# Patient Record
Sex: FEMALE | Race: WHITE | NOT HISPANIC OR LATINO | Employment: UNEMPLOYED | URBAN - METROPOLITAN AREA
[De-identification: names, ages, dates, MRNs, and addresses within clinical notes are randomized per-mention and may not be internally consistent; named-entity substitution may affect disease eponyms.]

---

## 2018-03-26 ENCOUNTER — HOSPITAL ENCOUNTER (EMERGENCY)
Facility: HOSPITAL | Age: 28
Discharge: HOME | End: 2018-03-26
Attending: EMERGENCY MEDICINE

## 2018-03-26 VITALS
SYSTOLIC BLOOD PRESSURE: 97 MMHG | HEART RATE: 75 BPM | BODY MASS INDEX: 20.37 KG/M2 | DIASTOLIC BLOOD PRESSURE: 72 MMHG | OXYGEN SATURATION: 99 % | RESPIRATION RATE: 16 BRPM | WEIGHT: 115 LBS

## 2018-03-26 DIAGNOSIS — T40.1X1A ACCIDENTAL OVERDOSE OF HEROIN, INITIAL ENCOUNTER: Primary | ICD-10-CM

## 2018-03-26 DIAGNOSIS — R41.82 ALTERED MENTAL STATUS, UNSPECIFIED ALTERED MENTAL STATUS TYPE: ICD-10-CM

## 2018-03-26 DIAGNOSIS — J96.00 ACUTE RESPIRATORY FAILURE, UNSPECIFIED WHETHER WITH HYPOXIA OR HYPERCAPNIA: ICD-10-CM

## 2018-03-26 LAB
ALBUMIN SERPL BCP-MCNC: 3.4 G/DL
ALP SERPL-CCNC: 57 U/L
ALT SERPL W/O P-5'-P-CCNC: 30 U/L
ANION GAP SERPL CALC-SCNC: 16 MMOL/L
AST SERPL-CCNC: 35 U/L
BASOPHILS # BLD AUTO: 0.02 K/UL
BASOPHILS NFR BLD: 0.3 %
BILIRUB SERPL-MCNC: 0.3 MG/DL
BUN SERPL-MCNC: 7 MG/DL
CALCIUM SERPL-MCNC: 8.4 MG/DL
CHLORIDE SERPL-SCNC: 106 MMOL/L
CO2 SERPL-SCNC: 16 MMOL/L
CREAT SERPL-MCNC: 0.9 MG/DL
DIFFERENTIAL METHOD: ABNORMAL
EOSINOPHIL # BLD AUTO: 0 K/UL
EOSINOPHIL NFR BLD: 0.4 %
ERYTHROCYTE [DISTWIDTH] IN BLOOD BY AUTOMATED COUNT: 13 %
EST. GFR  (AFRICAN AMERICAN): >60 ML/MIN/1.73 M^2
EST. GFR  (NON AFRICAN AMERICAN): >60 ML/MIN/1.73 M^2
GLUCOSE SERPL-MCNC: 290 MG/DL
HCT VFR BLD AUTO: 36.2 %
HGB BLD-MCNC: 11.7 G/DL
IMM GRANULOCYTES # BLD AUTO: 0.05 K/UL
IMM GRANULOCYTES NFR BLD AUTO: 0.7 %
INR PPP: 1
LYMPHOCYTES # BLD AUTO: 3.3 K/UL
LYMPHOCYTES NFR BLD: 48.9 %
MCH RBC QN AUTO: 31.6 PG
MCHC RBC AUTO-ENTMCNC: 32.3 G/DL
MCV RBC AUTO: 98 FL
MONOCYTES # BLD AUTO: 0.5 K/UL
MONOCYTES NFR BLD: 7.1 %
NEUTROPHILS # BLD AUTO: 2.9 K/UL
NEUTROPHILS NFR BLD: 42.6 %
NRBC BLD-RTO: 0 /100 WBC
PLATELET # BLD AUTO: 271 K/UL
PMV BLD AUTO: 9.5 FL
POTASSIUM SERPL-SCNC: 3.5 MMOL/L
PROT SERPL-MCNC: 7.2 G/DL
PROTHROMBIN TIME: 10.4 SEC
RBC # BLD AUTO: 3.7 M/UL
SODIUM SERPL-SCNC: 138 MMOL/L
WBC # BLD AUTO: 6.73 K/UL

## 2018-03-26 PROCEDURE — 80053 COMPREHEN METABOLIC PANEL: CPT

## 2018-03-26 PROCEDURE — 25000003 PHARM REV CODE 250: Performed by: EMERGENCY MEDICINE

## 2018-03-26 PROCEDURE — 85025 COMPLETE CBC W/AUTO DIFF WBC: CPT

## 2018-03-26 PROCEDURE — 96361 HYDRATE IV INFUSION ADD-ON: CPT

## 2018-03-26 PROCEDURE — 96360 HYDRATION IV INFUSION INIT: CPT

## 2018-03-26 PROCEDURE — 99284 EMERGENCY DEPT VISIT MOD MDM: CPT | Mod: 25

## 2018-03-26 PROCEDURE — 85610 PROTHROMBIN TIME: CPT

## 2018-03-26 PROCEDURE — 99291 CRITICAL CARE FIRST HOUR: CPT | Mod: ,,, | Performed by: EMERGENCY MEDICINE

## 2018-03-26 RX ADMIN — SODIUM CHLORIDE, POTASSIUM CHLORIDE, SODIUM LACTATE AND CALCIUM CHLORIDE 1000 ML: 600; 310; 30; 20 INJECTION, SOLUTION INTRAVENOUS at 03:03

## 2018-03-26 NOTE — ED PROVIDER NOTES
Encounter Date: 3/26/2018       History     Chief Complaint   Patient presents with    Drug Overdose     HPI   29 yo female who presented to the ED unresponsive in friend's car with minimal respiratory drive. Pt was unresponsive and provided no history. Clinical picture was suspicious for opiate overdose. Nurses helped to pull patient out of care and bring patient into resuscitation bay.  After IV access and narcan, pt became more responsive. States she did heroin a few hours earlier and then took 3 xanax bars around 2 am and doesn't remember anything else. Denies SI or intentions to harm self.     Review of patient's allergies indicates:  No Known Allergies  Past Medical History:   Diagnosis Date    Gall stones      No past surgical history on file.  Family History   Problem Relation Age of Onset    No Known Problems Mother     No Known Problems Father     Emphysema Maternal Grandmother     Cancer Maternal Grandfather     Diabetes Paternal Grandmother     Heart disease Paternal Grandmother      Social History   Substance Use Topics    Smoking status: Current Every Day Smoker     Packs/day: 0.50     Years: 8.00     Types: Cigarettes    Smokeless tobacco: Never Used    Alcohol use Yes      Comment: occasionally     Review of Systems   Unable to perform ROS: Patient unresponsive       Physical Exam     Initial Vitals [03/26/18 0344]   BP Pulse Resp Temp SpO2   -- (!) 120 (!) 8 -- --      MAP       --         Physical Exam    Nursing note and vitals reviewed.  Constitutional: She is diaphoretic.   Eyes:   Pinpoint   Cardiovascular: Regular rhythm, normal heart sounds and intact distal pulses. Exam reveals no gallop and no friction rub.    No murmur heard.  Tachycardia   Pulmonary/Chest:   Bradypnea   Minimal respiratory effort  Coarse BS b/l   Neurological: She is unresponsive.         ED Course   Critical Care  Date/Time: 3/26/2018 3:40 AM  Performed by: NEIL VEGA  Authorized by: SILVIA  NEIL DILLARD   Direct patient critical care time: 15 minutes  Additional history critical care time: 5 minutes  Ordering / reviewing critical care time: 10 minutes  Documentation critical care time: 5 minutes  Total critical care time (exclusive of procedural time) : 35 minutes  Critical care time was exclusive of teaching time.  Critical care was necessary to treat or prevent imminent or life-threatening deterioration of the following conditions: CNS failure or compromise, shock and toxidrome.  Critical care was time spent personally by me on the following activities: development of treatment plan with patient or surrogate, interpretation of cardiac output measurements, evaluation of patient's response to treatment, examination of patient, obtaining history from patient or surrogate, ordering and performing treatments and interventions, ordering and review of laboratory studies, pulse oximetry, re-evaluation of patient's condition and review of old charts.        Labs Reviewed   CBC W/ AUTO DIFFERENTIAL - Abnormal; Notable for the following:        Result Value    RBC 3.70 (*)     Hemoglobin 11.7 (*)     Hematocrit 36.2 (*)     MCH 31.6 (*)     Immature Granulocytes 0.7 (*)     Immature Grans (Abs) 0.05 (*)     Lymph% 48.9 (*)     All other components within normal limits   COMPREHENSIVE METABOLIC PANEL - Abnormal; Notable for the following:     CO2 16 (*)     Glucose 290 (*)     Calcium 8.4 (*)     Albumin 3.4 (*)     All other components within normal limits   PROTIME-INR                   APC / Resident Notes:   27 yo female who presented to the ED unresponsive in friend's car with minimal respiratory drive suspicious for opiate overdose. Initially unresponsive but responded to intranasal narcan and is now coherent and talking. Will obtain basic labs and order IVF. Will continue to reassess mental status.     Jose Umana, PGY-2  4:21 AM           Attending Attestation:   Physician Attestation Statement for  Resident:  As the supervising MD   Physician Attestation Statement: I have personally seen and examined this patient.   I agree with the above history. -: 28-year-old woman with history of opiate dependency presents for evaluation of altered mental status/apnea after suspected IV opiate use.   As the supervising MD I agree with the above PE.    As the supervising MD I agree with the above treatment, course, plan, and disposition.  I have reviewed the following: old records at this facility.                       Clinical Impression:   The primary encounter diagnosis was Accidental overdose of heroin, initial encounter. Diagnoses of Altered mental status, unspecified altered mental status type and Acute respiratory failure, unspecified whether with hypoxia or hypercapnia were also pertinent to this visit.    Disposition:   Disposition: Discharged  Condition: Stable                        Sedrick Tello MD  03/27/18 8093

## 2018-03-26 NOTE — ED NOTES
I acknowledge care of this pt. Pt AAO x 4 lying in stretcher with friends at bedside. Pt on continuous cardiac monitor and pulse ox with blood pressure cycling every thirty minutes. Respirations are unlabored and equal. Side rails up x 2 bed locked and in low position with call light in reach.Pt aware of plan of care. VSS. NAD noted. Will continue to monitor

## 2018-10-22 ENCOUNTER — TELEPHONE (OUTPATIENT)
Dept: OBSTETRICS AND GYNECOLOGY | Facility: CLINIC | Age: 28
End: 2018-10-22

## 2018-10-22 NOTE — TELEPHONE ENCOUNTER
----- Message from Zehra Hurtado sent at 10/22/2018  4:31 PM CDT -----  Contact: pt      Can the clinic reply in [facility]: no    Who Called: pt    Date of Positive Preg Test: 2 weeks ago    1st day of Last Menstrual Cycle: august 9th    List Any Difficulties: no    What Number to Call Back: [phone]

## 2018-10-22 NOTE — TELEPHONE ENCOUNTER
conducted a pre-surgery visit with Bull Gonzalez, who is a 72 y.o.,female. The  provided the following Interventions:  Initiated a relationship of care and support. Offered prayer and assurance of continued prayers on patient's behalf. Plan:  Chaplains will continue to follow and will provide pastoral care on an as needed/requested basis.  recommends bedside caregivers page  on duty if patient shows signs of acute spiritual or emotional distress.     [address] [facility]   Board Certified [address] [place] [address]   [phone] Call returned appt scheduled with Dr. Bradley on Friday

## 2018-11-02 ENCOUNTER — APPOINTMENT (OUTPATIENT)
Dept: LAB | Facility: HOSPITAL | Age: 28
End: 2018-11-02
Attending: OBSTETRICS & GYNECOLOGY
Payer: MEDICAID

## 2018-11-02 ENCOUNTER — OFFICE VISIT (OUTPATIENT)
Dept: OBSTETRICS AND GYNECOLOGY | Facility: CLINIC | Age: 28
End: 2018-11-02
Payer: MEDICAID

## 2018-11-02 VITALS
DIASTOLIC BLOOD PRESSURE: 70 MMHG | BODY MASS INDEX: 21.87 KG/M2 | SYSTOLIC BLOOD PRESSURE: 110 MMHG | HEIGHT: 63 IN | WEIGHT: 123.44 LBS

## 2018-11-02 DIAGNOSIS — F11.20 SUBOXONE MAINTENANCE TREATMENT COMPLICATING PREGNANCY, ANTEPARTUM: ICD-10-CM

## 2018-11-02 DIAGNOSIS — Z34.90 PREGNANCY WITH ADOPTION PLANNED, ANTEPARTUM: ICD-10-CM

## 2018-11-02 DIAGNOSIS — O99.320 SUBOXONE MAINTENANCE TREATMENT COMPLICATING PREGNANCY, ANTEPARTUM: ICD-10-CM

## 2018-11-02 DIAGNOSIS — O21.9 NAUSEA/VOMITING IN PREGNANCY: ICD-10-CM

## 2018-11-02 DIAGNOSIS — Z32.01 POSITIVE PREGNANCY TEST: Primary | ICD-10-CM

## 2018-11-02 DIAGNOSIS — F19.21 HISTORY OF DRUG DEPENDENCE/ABUSE: ICD-10-CM

## 2018-11-02 LAB
BASOPHILS # BLD AUTO: 0.01 K/UL
BASOPHILS NFR BLD: 0.2 %
DIFFERENTIAL METHOD: ABNORMAL
EOSINOPHIL # BLD AUTO: 0 K/UL
EOSINOPHIL NFR BLD: 0 %
ERYTHROCYTE [DISTWIDTH] IN BLOOD BY AUTOMATED COUNT: 14.3 %
HCT VFR BLD AUTO: 34.6 %
HGB BLD-MCNC: 11.3 G/DL
IMM GRANULOCYTES # BLD AUTO: 0.06 K/UL
IMM GRANULOCYTES NFR BLD AUTO: 1.2 %
LYMPHOCYTES # BLD AUTO: 0.4 K/UL
LYMPHOCYTES NFR BLD: 8 %
MCH RBC QN AUTO: 29.4 PG
MCHC RBC AUTO-ENTMCNC: 32.7 G/DL
MCV RBC AUTO: 90 FL
MONOCYTES # BLD AUTO: 0.1 K/UL
MONOCYTES NFR BLD: 1 %
NEUTROPHILS # BLD AUTO: 4.4 K/UL
NEUTROPHILS NFR BLD: 89.6 %
NRBC BLD-RTO: 0 /100 WBC
PLATELET # BLD AUTO: 255 K/UL
PMV BLD AUTO: 9.8 FL
RBC # BLD AUTO: 3.85 M/UL
WBC # BLD AUTO: 4.87 K/UL

## 2018-11-02 PROCEDURE — 87340 HEPATITIS B SURFACE AG IA: CPT

## 2018-11-02 PROCEDURE — 86762 RUBELLA ANTIBODY: CPT

## 2018-11-02 PROCEDURE — 86703 HIV-1/HIV-2 1 RESULT ANTBDY: CPT

## 2018-11-02 PROCEDURE — 86901 BLOOD TYPING SEROLOGIC RH(D): CPT

## 2018-11-02 PROCEDURE — 99203 OFFICE O/P NEW LOW 30 MIN: CPT | Mod: S$PBB,TH,, | Performed by: OBSTETRICS & GYNECOLOGY

## 2018-11-02 PROCEDURE — 86592 SYPHILIS TEST NON-TREP QUAL: CPT

## 2018-11-02 PROCEDURE — 88175 CYTOPATH C/V AUTO FLUID REDO: CPT

## 2018-11-02 PROCEDURE — 99999 PR PBB SHADOW E&M-EST. PATIENT-LVL III: CPT | Mod: PBBFAC,,, | Performed by: OBSTETRICS & GYNECOLOGY

## 2018-11-02 PROCEDURE — 86787 VARICELLA-ZOSTER ANTIBODY: CPT

## 2018-11-02 PROCEDURE — 85025 COMPLETE CBC W/AUTO DIFF WBC: CPT

## 2018-11-02 PROCEDURE — 99213 OFFICE O/P EST LOW 20 MIN: CPT | Mod: PBBFAC,25,TH,PO | Performed by: OBSTETRICS & GYNECOLOGY

## 2018-11-02 PROCEDURE — 87086 URINE CULTURE/COLONY COUNT: CPT

## 2018-11-02 PROCEDURE — 87491 CHLMYD TRACH DNA AMP PROBE: CPT

## 2018-11-02 RX ORDER — PROMETHAZINE HYDROCHLORIDE 25 MG/1
25 TABLET ORAL EVERY 6 HOURS PRN
Qty: 30 TABLET | Refills: 1 | Status: ON HOLD | OUTPATIENT
Start: 2018-11-02 | End: 2019-05-24 | Stop reason: HOSPADM

## 2018-11-02 RX ORDER — PYRIDOXINE HCL (VITAMIN B6) 25 MG
25 TABLET ORAL 3 TIMES DAILY
Qty: 90 TABLET | Refills: 2 | Status: SHIPPED | OUTPATIENT
Start: 2018-11-02 | End: 2019-01-31

## 2018-11-02 RX ORDER — AMOXICILLIN 500 MG/1
CAPSULE ORAL
Refills: 0 | Status: ON HOLD | COMMUNITY
Start: 2018-10-31 | End: 2019-05-24 | Stop reason: HOSPADM

## 2018-11-02 RX ORDER — BUPRENORPHINE HYDROCHLORIDE AND NALOXONE HYDROCHLORIDE DIHYDRATE 2; .5 MG/1; MG/1
TABLET SUBLINGUAL EVERY 6 HOURS PRN
COMMUNITY

## 2018-11-02 NOTE — PROGRESS NOTES
Chief Complaint   Patient presents with    Amenorrhea     LMP 18       HPI:  28 y.o. female  presents as a new patient for confirmation of pregnancy.    Patient's last menstrual period was 2018 (exact date).    - Nausea:  Yes  - Vomiting: Yes  - Cramping: No  - Bleeding:  No    - Denies family history of bleeding disorders, birth defects, or mental disability    - U/S at [facility] on 10/9/2018 gave an EGA of 6 weeks    - History of drug abuse; currently on suboxone therapy  - No illicit drug use x3 months  - Does not recall who prescribes her suboxone; she has an appointment with them next month    - Plans to put the baby up for adoption; she has already contacted a     Contraception: None  Pap: 2018, No recent documented pap       Past Medical History:   Diagnosis Date    Gall stones      Past Surgical History:   Procedure Laterality Date    CHOLECYSTECTOMY      CHOLECYSTECTOMY-LAPAROSCOPIC N/A 2016    Performed by Jose Orantes MD at [facility] OR       Social History     Tobacco Use    Smoking status: Current Every Day Smoker     Packs/day: 0.50     Years: 8.00     Pack years: 4.00     Types: Cigarettes    Smokeless tobacco: Never Used   Substance Use Topics    Alcohol use: No     Frequency: Never     Comment: occasionally; pre pregnancy     Family History   Problem Relation Age of Onset    No Known Problems Mother     No Known Problems Father     Emphysema Maternal Grandmother     Cancer Maternal Grandfather     Diabetes Paternal Grandmother     Heart disease Paternal Grandmother     Breast cancer Neg Hx     Colon cancer Neg Hx     Ovarian cancer Neg Hx      OB History    Para Term  AB Living   3       2 0   SAB TAB Ectopic Multiple Live Births   2              # Outcome Date GA Lbr Louis/2nd Weight Sex Delivery Anes PTL Lv   3 Current            2 SAB            1 SAB                   MEDICATIONS: Reviewed with patient.  ALLERGIES: Patient  "has no known allergies.     ROS:  Review of Systems   Constitutional: Negative for fever.   Respiratory: Negative for shortness of breath.    Cardiovascular: Negative for chest pain.   Gastrointestinal: Negative for abdominal pain, nausea and vomiting.   Endocrine: Negative for hot flashes.   Genitourinary: Negative for pelvic pain and vaginal bleeding.   Neurological: Negative for headaches.   Hematological: Does not bruise/bleed easily.   Psychiatric/Behavioral: Negative for depression.   Breast: Negative for breast mass, breast pain, nipple discharge and skin changes      PHYSICAL EXAM:    /70   Ht 5' 3" (1.6 m)   Wt 56 kg (123 lb 7.3 oz)   LMP 08/08/2018 (Exact Date)   BMI 21.87 kg/m²     Physical Exam:   Constitutional: She is oriented to person, place, and time. She appears well-developed.   No fever    HENT:   Head: Normocephalic.     Neck: No thyromegaly present.    Cardiovascular: Normal rate.     Pulmonary/Chest: Effort normal. Right breast exhibits no mass, no nipple discharge, no skin change, no tenderness and no swelling. Left breast exhibits no mass, no nipple discharge, no skin change, no tenderness and no swelling. Breasts are symmetrical.        Abdominal: She exhibits no mass. There is no hepatosplenomegaly. There is no tenderness. No hernia.     Genitourinary: Vagina normal. Uterus is not enlarged and not tender. Cervix is normal. Right adnexum displays no tenderness and no fullness. Left adnexum displays no tenderness and no fullness. No vaginal discharge found. Additional cervical findings: pap smear done  Genitourinary Comments: External genitalia: Normal  Urethra: No tenderness; normal meatus  Bladder: No tenderness              Lymphadenopathy:     She has no cervical adenopathy.     She has no axillary adenopathy.    Neurological: She is alert and oriented to person, place, and time.     Psychiatric: She has a normal mood and affect.         ASSESSMENT & PLAN:   Positive pregnancy " test  -     CBC auto differential  -     US OB/GYN Procedure ([facility]); Future  -     C. trachomatis/N. gonorrhoeae by AMP DNA  -     Hepatitis B surface antigen  -     HIV-1 and HIV-2 antibodies  -     US MFM Procedure ([facility]); Future  -     Liquid-based pap smear, screening  -     RPR  -     Rubella antibody, IgG  -     Type & Screen  -     Urine culture  -     Varicella zoster antibody, IgG    Nausea/vomiting in pregnancy  -     doxylamine succinate 25 mg tablet; Take 1 tablet (25 mg total) by mouth every evening.  Dispense: 30 tablet; Refill: 2  -     pyridoxine, vitamin B6, (B-6) 25 MG Tab; Take 1 tablet (25 mg total) by mouth 3 (three) times daily.  Dispense: 90 tablet; Refill: 2  -     promethazine (PHENERGAN) 25 MG tablet; Take 1 tablet (25 mg total) by mouth every 6 (six) hours as needed for Nausea.  Dispense: 30 tablet; Refill: 1    Suboxone maintenance treatment complicating pregnancy, antepartum    History of drug dependence/abuse    Pregnancy with adoption planned, antepartum        - UPT positive  - LMP=8/8/2018 --> TALON=5/15/2019 --> EGA=12.2  - U/S at [facility] on 10/9/2018 at 6.6 --> TALON=5/29/2019 --> EGA=10.2  - PNV  - Dating u/s  - Initial prenatal labs  - Pain and bleeding precautions given  - Return to clinic in 4 weeks for initial prenatal visit    - Aneuploidy screening: NT and sequential screen ordered    - VitB6, unisom, and phenergan for N/V    - Counseled patient that withdrawal is not recommended in pregnancy  - Recommended continued suboxone and regular follow-up with her prescriber  - Will screen for HepC on return to clinic    NEW PREGNANCY COUNSELING  Patient was counseled today on:  - Routine prenatal blood tests including HIV and anticipated course of prenatal care  - Prenatal vitamins and folic acid  - Weight gain, nutrition, and exercise  - Seafood and mercury  - Properly heating deli and prepared meats and avoiding unrefrigerated deli  meats, cheeses, and milk  products,   - Avoiding cat litter and raw meats due to risk of Toxoplasmosis precautions   - Accuracy of the LMP-based TALON and the value of an early TV-u/s  - Aneuploidy and neural tube screening -- cffDNA, sequential screening, and AFP screen at 15 weeks  - OTC medication in the first trimester  - Harmful effects of smoking, etOH, and recreational drugs  - [facility] u/s  at 18-20 weeks.  - Common complaints of pregnancy  - Seat belt use  - Childbirth classes and hospital facilities  - All questions were answered    Total visit time was 30 minutes with greater than 50% of time dedicated to counseling.

## 2018-11-03 LAB
ABO + RH BLD: NORMAL
BACTERIA UR CULT: NO GROWTH
BLD GP AB SCN CELLS X3 SERPL QL: NORMAL
C TRACH DNA SPEC QL NAA+PROBE: NOT DETECTED
N GONORRHOEA DNA SPEC QL NAA+PROBE: NOT DETECTED
VARICELLA INTERPRETATION: POSITIVE
VARICELLA ZOSTER IGG: 3.93 ISR

## 2018-11-05 LAB
HBV SURFACE AG SERPL QL IA: NEGATIVE
HIV 1+2 AB+HIV1 P24 AG SERPL QL IA: NEGATIVE
RPR SER QL: NORMAL
RUBV IGG SER-ACNC: 12.3 IU/ML
RUBV IGG SER-IMP: REACTIVE

## 2018-11-06 ENCOUNTER — TELEPHONE (OUTPATIENT)
Dept: OBSTETRICS AND GYNECOLOGY | Facility: CLINIC | Age: 28
End: 2018-11-06

## 2018-11-06 NOTE — TELEPHONE ENCOUNTER
----- Message from Jade Short MA sent at 11/6/2018  1:25 PM CST -----  Contact: ROSS MCCAIN [[id number]]  Did you attempt to call her and see medication she was talking about? I was not working with  on Friday.  ----- Message -----  From: Anderson Palafox MA  Sent: 11/6/2018   1:18 PM  To: Jade Short MA    Hey, this message was sent to you because Dr. Bradley saw the patient.  ----- Message -----  From: Jade Short MA  Sent: 11/6/2018  11:52 AM  To: Anderson Palafox MA    Why is this forwarded to me?  ----- Message -----  From: Anderson Palafox MA  Sent: 11/6/2018  11:46 AM  To: Jade Short MA        ----- Message -----  From: Jeanne Churchill  Sent: 11/6/2018  11:35 AM  To: , #              Name of Who is Calling:ROSS MCCAIN [[id number]]    What is the request in detail: patient would like Prior authorization for medication prescribed Friday. Patient does not know the name of the medication. Please call      Can the clinic reply by [facility]: no    What Number to Call Back if not in [facility]:[phone]

## 2018-11-14 PROBLEM — Z34.90 PREGNANCY WITH ADOPTION PLANNED, ANTEPARTUM: Status: ACTIVE | Noted: 2018-11-14

## 2018-11-19 ENCOUNTER — TELEPHONE (OUTPATIENT)
Dept: OBSTETRICS AND GYNECOLOGY | Facility: CLINIC | Age: 28
End: 2018-11-19

## 2018-11-19 NOTE — TELEPHONE ENCOUNTER
----- Message from Khadijah Torres sent at 11/19/2018  1:07 PM CST -----  Contact: self  Pt had a appt on Friday for a dating ultrasound but missed it . She wants you to reschedule her one please. Call back number is [phone]

## 2018-11-26 ENCOUNTER — TELEPHONE (OUTPATIENT)
Dept: OBSTETRICS AND GYNECOLOGY | Facility: CLINIC | Age: 28
End: 2018-11-26

## 2018-11-26 NOTE — TELEPHONE ENCOUNTER
----- Message from Loida Singh sent at 11/26/2018  8:07 AM CST -----  Contact: SABAS  Pt was a no show for her MFM appt. sh

## 2018-12-19 ENCOUNTER — TELEPHONE (OUTPATIENT)
Dept: OBSTETRICS AND GYNECOLOGY | Facility: CLINIC | Age: 28
End: 2018-12-19

## 2018-12-19 NOTE — TELEPHONE ENCOUNTER
Called and spoke with pt. Informed her that she needs to have an US done, will place order and MFM will contact her to schedule US. Offered pt an appt before or after her US, pt chooses to come in after having US done. Will call office when she has an US scheduled to schedule a JUSTYNA. Pt has clinic number. Pt verbalized understanding and no further questions.

## 2018-12-27 DIAGNOSIS — Z36.89 ESTABLISH GESTATIONAL AGE, ULTRASOUND: Primary | ICD-10-CM

## 2018-12-27 DIAGNOSIS — Z36.89 ENCOUNTER FOR FETAL ANATOMIC SURVEY: ICD-10-CM

## 2019-01-08 ENCOUNTER — TELEPHONE (OUTPATIENT)
Dept: OBSTETRICS AND GYNECOLOGY | Facility: CLINIC | Age: 29
End: 2019-01-08

## 2019-01-08 NOTE — TELEPHONE ENCOUNTER
Spoke to mother patient's. Attempted to contact patient regarding missed MFM appointment. Mother states she does not know how to contact her and does not know her current location.

## 2019-01-08 NOTE — TELEPHONE ENCOUNTER
----- Message from Loida Singh sent at 1/8/2019  8:33 AM CST -----  Contact: SABAS  Pt was a no show for her MFM appt. sh

## 2019-02-13 ENCOUNTER — TELEPHONE (OUTPATIENT)
Dept: OBSTETRICS AND GYNECOLOGY | Facility: CLINIC | Age: 29
End: 2019-02-13

## 2019-02-13 NOTE — TELEPHONE ENCOUNTER
----- Message from Francis Gould sent at 2/13/2019  3:58 PM CST -----  Contact: ROSS MCCAIN [[id number]]  Name of Who is Calling: ROSS MCCAIN [[id number]]       What is the request in detail: Pt call in regards to rescheduling her Routine OB. Pt request call back from staff to schedule. Please advise      Can the clinic reply by [facility]: No       What Number to Call Back if not in [facility]: [phone]

## 2019-02-13 NOTE — TELEPHONE ENCOUNTER
Spoke to patient. Patient relocated to [place] for a month and has not had prenatal care since last visit in November due her medicaid not being accepted. Scheduled appointment to be seen.

## 2019-02-14 ENCOUNTER — ROUTINE PRENATAL (OUTPATIENT)
Dept: OBSTETRICS AND GYNECOLOGY | Facility: CLINIC | Age: 29
End: 2019-02-14
Payer: MEDICAID

## 2019-02-14 VITALS
WEIGHT: 133.38 LBS | SYSTOLIC BLOOD PRESSURE: 104 MMHG | DIASTOLIC BLOOD PRESSURE: 60 MMHG | BODY MASS INDEX: 22.89 KG/M2

## 2019-02-14 DIAGNOSIS — O09.92 HIGH-RISK PREGNANCY IN SECOND TRIMESTER: Primary | ICD-10-CM

## 2019-02-14 DIAGNOSIS — F19.21 HISTORY OF DRUG DEPENDENCE/ABUSE: ICD-10-CM

## 2019-02-14 DIAGNOSIS — O99.320 SUBOXONE MAINTENANCE TREATMENT COMPLICATING PREGNANCY, ANTEPARTUM: ICD-10-CM

## 2019-02-14 DIAGNOSIS — F11.20 SUBOXONE MAINTENANCE TREATMENT COMPLICATING PREGNANCY, ANTEPARTUM: ICD-10-CM

## 2019-02-14 PROBLEM — O09.90 HIGH RISK PREGNANCY, ANTEPARTUM: Status: ACTIVE | Noted: 2018-11-14

## 2019-02-14 PROCEDURE — 99212 OFFICE O/P EST SF 10 MIN: CPT | Mod: PBBFAC,TH,PO | Performed by: OBSTETRICS & GYNECOLOGY

## 2019-02-14 PROCEDURE — 99213 PR OFFICE/OUTPT VISIT, EST, LEVL III, 20-29 MIN: ICD-10-PCS | Mod: TH,S$PBB,, | Performed by: OBSTETRICS & GYNECOLOGY

## 2019-02-14 PROCEDURE — 99999 PR PBB SHADOW E&M-EST. PATIENT-LVL II: CPT | Mod: PBBFAC,,, | Performed by: OBSTETRICS & GYNECOLOGY

## 2019-02-14 PROCEDURE — 99999 PR PBB SHADOW E&M-EST. PATIENT-LVL II: ICD-10-PCS | Mod: PBBFAC,,, | Performed by: OBSTETRICS & GYNECOLOGY

## 2019-02-14 PROCEDURE — 99213 OFFICE O/P EST LOW 20 MIN: CPT | Mod: TH,S$PBB,, | Performed by: OBSTETRICS & GYNECOLOGY

## 2019-02-14 NOTE — PROGRESS NOTES
Chief Complaint   Patient presents with    Routine Prenatal Visit       28 y.o., at 25w1d by Estimated Date of Delivery: 19    Complaints today: Back pain    ROS  OBSTETRICS:   Contractions N   Bleeding N   Loss of fluid N   Fetal mvmnt Y  GASTRO:   Nausea N   Vomiting N      OB History    Para Term  AB Living   3       2 0   SAB TAB Ectopic Multiple Live Births   2              # Outcome Date GA Lbr Louis/2nd Weight Sex Delivery Anes PTL Lv   3 Current            2 SAB            1 SAB                   Dating reviewed  Allergies and problem list reviewed and updated  Medical and surgical history reviewed  Prenatal labs reviewed and updated    PHYSICAL EXAM  /60   Wt 60.5 kg (133 lb 6.1 oz)   LMP 2018 (Exact Date)   BMI 22.89 kg/m²     GENERAL: No acute distress  NEURO: Alert and oriented x3  PSYCH: Normal mood and affect  PULMONARY: Non-labored respiration  ABDomen: Soft, gravid, nontender    ASSESSMENT AND PLAN     Problems (from 19 to present)     Problem Noted Resolved    High risk pregnancy, antepartum 2018 by DEMOND Bradley MD No    Overview Addendum 2019  4:45 PM by DEMOND Bradley MD     Dating - By 6 week u/s at [facility]; no records available.  U/S - Anatomy u/s ordered.  Aneuploidy screening - Missed window.  Vaccines -  Contraception -  Pap - 2018: NILM.  Needs assessment -           Suboxone maintenance treatment complicating pregnancy, antepartum 2018 by DEMOND Bradley MD No          Dating - Patient with early u/s in ED or [facility] (pt is poor historian).  At her last appt, she gave an TALON from u/s of 2019, but no records or u/s report is available.  TALON from LMP is 5/15/2019.  Will reconsider TALON based on anatomy u/s.  PNL - Schedule glucose screen and repeat CBC.  U/S - Anatomy u/s ordered.  Aneuploidy screening - Missed window.  Hx/o DA / Suboxone maintenance    - Patient endorses compliance with  suboxone and follow-up with her provider    - Encouraged continued suboxone use; counseled pt on risks of detox and relapse in pregnancy    - Check HepC AB with 2nd trimester labs.  Vaccines - Flu vaccine and Tdap on return to clinic.     labor precautions given  Follow-up: 4 weeks

## 2019-02-27 ENCOUNTER — PROCEDURE VISIT (OUTPATIENT)
Dept: MATERNAL FETAL MEDICINE | Facility: CLINIC | Age: 29
End: 2019-02-27
Payer: MEDICAID

## 2019-02-27 DIAGNOSIS — O09.92 HIGH-RISK PREGNANCY IN SECOND TRIMESTER: ICD-10-CM

## 2019-02-27 DIAGNOSIS — F11.20 SUBOXONE MAINTENANCE TREATMENT COMPLICATING PREGNANCY, ANTEPARTUM: ICD-10-CM

## 2019-02-27 DIAGNOSIS — Z36.4 ANTENATAL SCREENING FOR FETAL GROWTH RETARDATION USING ULTRASONICS: ICD-10-CM

## 2019-02-27 DIAGNOSIS — Z36.3 ANTENATAL SCREENING FOR MALFORMATION USING ULTRASONICS: ICD-10-CM

## 2019-02-27 DIAGNOSIS — O99.320 SUBOXONE MAINTENANCE TREATMENT COMPLICATING PREGNANCY, ANTEPARTUM: ICD-10-CM

## 2019-02-27 DIAGNOSIS — F19.21 HISTORY OF DRUG DEPENDENCE/ABUSE: ICD-10-CM

## 2019-02-27 PROCEDURE — 76811 OB US DETAILED SNGL FETUS: CPT | Mod: 26,S$PBB,, | Performed by: OBSTETRICS & GYNECOLOGY

## 2019-02-27 PROCEDURE — 99499 NO LOS: ICD-10-PCS | Mod: S$PBB,,, | Performed by: OBSTETRICS & GYNECOLOGY

## 2019-02-27 PROCEDURE — 76811 PR US, OB FETAL EVAL & EXAM, TRANSABDOM,FIRST GESTATION: ICD-10-PCS | Mod: 26,S$PBB,, | Performed by: OBSTETRICS & GYNECOLOGY

## 2019-02-27 PROCEDURE — 99499 UNLISTED E&M SERVICE: CPT | Mod: S$PBB,,, | Performed by: OBSTETRICS & GYNECOLOGY

## 2019-02-27 PROCEDURE — 76811 OB US DETAILED SNGL FETUS: CPT | Mod: PBBFAC | Performed by: OBSTETRICS & GYNECOLOGY

## 2019-03-04 ENCOUNTER — TELEPHONE (OUTPATIENT)
Dept: MATERNAL FETAL MEDICINE | Facility: CLINIC | Age: 29
End: 2019-03-04

## 2019-03-04 DIAGNOSIS — Z36.89 ENCOUNTER FOR ULTRASOUND TO CHECK FETAL GROWTH: Primary | ICD-10-CM

## 2019-03-04 DIAGNOSIS — O99.323 SUBOXONE MAINTENANCE TREATMENT COMPLICATING PREGNANCY, ANTEPARTUM, THIRD TRIMESTER: ICD-10-CM

## 2019-03-04 DIAGNOSIS — F11.20 SUBOXONE MAINTENANCE TREATMENT COMPLICATING PREGNANCY, ANTEPARTUM, THIRD TRIMESTER: ICD-10-CM

## 2019-03-04 NOTE — TELEPHONE ENCOUNTER
Message left to call [facility] back at her earliest convenience at [phone].    ----- Message from Mitzi Palacios MD sent at 3/2/2019  3:46 PM CST -----    Y'all:    Patient needs a follow up ultrasound with MFM in 4-4 wks for fetal growth.  Patient on suboxone maintenance.     Thank you  Philip

## 2019-03-07 ENCOUNTER — TELEPHONE (OUTPATIENT)
Dept: OBSTETRICS AND GYNECOLOGY | Facility: CLINIC | Age: 29
End: 2019-03-07

## 2019-03-07 NOTE — TELEPHONE ENCOUNTER
Called to notify pt that her US results were norrmal. No answer. Unable to leave message because VM box was full.

## 2019-03-07 NOTE — TELEPHONE ENCOUNTER
----- Message from Akilah Prado LPN sent at 3/4/2019  3:37 PM CST -----  DEMOND Bradley MD  [facility] Clinical Support Staff     03/04/2019    Please notify patient that her ultrasound was normal.  Thanks.

## 2019-04-09 ENCOUNTER — TELEPHONE (OUTPATIENT)
Dept: OBSTETRICS AND GYNECOLOGY | Facility: CLINIC | Age: 29
End: 2019-04-09

## 2019-04-10 ENCOUNTER — TELEPHONE (OUTPATIENT)
Dept: OBSTETRICS AND GYNECOLOGY | Facility: CLINIC | Age: 29
End: 2019-04-10

## 2019-04-10 NOTE — TELEPHONE ENCOUNTER
Patient called regarding a letter stating she can attend a opiate detox program at [place]. Advised patient per Dr. Bradley that detoxing is not recommend in pregnancy. Patient is schedule for ob follow up this week.

## 2019-04-10 NOTE — TELEPHONE ENCOUNTER
Spoke to patient. Advised patient per Dr. Bradley that she will need to come in the office to discuss letter for detox program.

## 2019-04-10 NOTE — TELEPHONE ENCOUNTER
----- Message from Rocky Moe sent at 4/10/2019  2:01 PM CDT -----  33wks ob pt needs to talk to Dr enciso about going to detox. Pt can be reached at [phone].

## 2019-04-22 ENCOUNTER — ROUTINE PRENATAL (OUTPATIENT)
Dept: OBSTETRICS AND GYNECOLOGY | Facility: CLINIC | Age: 29
End: 2019-04-22
Payer: MEDICAID

## 2019-04-22 ENCOUNTER — APPOINTMENT (OUTPATIENT)
Dept: LAB | Facility: HOSPITAL | Age: 29
End: 2019-04-22
Attending: OBSTETRICS & GYNECOLOGY
Payer: MEDICAID

## 2019-04-22 VITALS
SYSTOLIC BLOOD PRESSURE: 118 MMHG | WEIGHT: 134.94 LBS | DIASTOLIC BLOOD PRESSURE: 70 MMHG | BODY MASS INDEX: 23.16 KG/M2

## 2019-04-22 DIAGNOSIS — Z30.014 ENCOUNTER FOR INITIAL PRESCRIPTION OF INTRAUTERINE CONTRACEPTIVE DEVICE (IUD): ICD-10-CM

## 2019-04-22 DIAGNOSIS — F11.20 SUBOXONE MAINTENANCE TREATMENT COMPLICATING PREGNANCY, ANTEPARTUM: ICD-10-CM

## 2019-04-22 DIAGNOSIS — O99.323 HIGH RISK PREGNANCY DUE TO MATERNAL DRUG ABUSE IN THIRD TRIMESTER: Primary | ICD-10-CM

## 2019-04-22 DIAGNOSIS — F19.10 HIGH RISK PREGNANCY DUE TO MATERNAL DRUG ABUSE IN THIRD TRIMESTER: Primary | ICD-10-CM

## 2019-04-22 DIAGNOSIS — O99.320 SUBOXONE MAINTENANCE TREATMENT COMPLICATING PREGNANCY, ANTEPARTUM: ICD-10-CM

## 2019-04-22 LAB
ERYTHROCYTE [DISTWIDTH] IN BLOOD BY AUTOMATED COUNT: 15.6 % (ref 11.5–14.5)
HCT VFR BLD AUTO: 36.5 % (ref 37–48.5)
HGB BLD-MCNC: 11.4 G/DL (ref 12–16)
MCH RBC QN AUTO: 28.9 PG (ref 27–31)
MCHC RBC AUTO-ENTMCNC: 31.2 G/DL (ref 32–36)
MCV RBC AUTO: 93 FL (ref 82–98)
PLATELET # BLD AUTO: 367 K/UL (ref 150–350)
PMV BLD AUTO: 9.5 FL (ref 9.2–12.9)
RBC # BLD AUTO: 3.94 M/UL (ref 4–5.4)
WBC # BLD AUTO: 6.32 K/UL (ref 3.9–12.7)

## 2019-04-22 PROCEDURE — 86703 HIV-1/HIV-2 1 RESULT ANTBDY: CPT

## 2019-04-22 PROCEDURE — 86803 HEPATITIS C AB TEST: CPT

## 2019-04-22 PROCEDURE — 85027 COMPLETE CBC AUTOMATED: CPT

## 2019-04-22 PROCEDURE — 99212 OFFICE O/P EST SF 10 MIN: CPT | Mod: PBBFAC,TH,PO | Performed by: STUDENT IN AN ORGANIZED HEALTH CARE EDUCATION/TRAINING PROGRAM

## 2019-04-22 PROCEDURE — 99999 PR PBB SHADOW E&M-EST. PATIENT-LVL II: ICD-10-PCS | Mod: PBBFAC,,, | Performed by: STUDENT IN AN ORGANIZED HEALTH CARE EDUCATION/TRAINING PROGRAM

## 2019-04-22 PROCEDURE — 99213 OFFICE O/P EST LOW 20 MIN: CPT | Mod: TH,S$PBB,, | Performed by: STUDENT IN AN ORGANIZED HEALTH CARE EDUCATION/TRAINING PROGRAM

## 2019-04-22 PROCEDURE — 99213 PR OFFICE/OUTPT VISIT, EST, LEVL III, 20-29 MIN: ICD-10-PCS | Mod: TH,S$PBB,, | Performed by: STUDENT IN AN ORGANIZED HEALTH CARE EDUCATION/TRAINING PROGRAM

## 2019-04-22 PROCEDURE — 86592 SYPHILIS TEST NON-TREP QUAL: CPT

## 2019-04-22 PROCEDURE — 99999 PR PBB SHADOW E&M-EST. PATIENT-LVL II: CPT | Mod: PBBFAC,,, | Performed by: STUDENT IN AN ORGANIZED HEALTH CARE EDUCATION/TRAINING PROGRAM

## 2019-04-22 NOTE — PROGRESS NOTES
Complaints today: none. Would like to discuss referral/letter for suboxone program and has found physician at outside facility to help with process.   No contractions. Denies vaginal bleeding or LOF. +FM.     /70   Wt 61.2 kg (134 lb 14.7 oz)   LMP 2018 (Exact Date)   BMI 23.16 kg/m²     29 y.o., at 34w4d by Estimated Date of Delivery: 19  Patient Active Problem List   Diagnosis    Suboxone maintenance treatment complicating pregnancy, antepartum    History of drug dependence/abuse    High risk pregnancy, antepartum     OB History    Para Term  AB Living   3       2 0   SAB TAB Ectopic Multiple Live Births   2              # Outcome Date GA Lbr Louis/2nd Weight Sex Delivery Anes PTL Lv   3 Current            2 SAB            1 SAB                Dating reviewed    Allergies and problem list reviewed and updated    Medical and surgical history reviewed    Prenatal labs reviewed and updated    Physical Exam:  ABD: soft, gravid, nontender, S = D    Assessment:  Ashley was seen today for routine prenatal visit.    Diagnoses and all orders for this visit:    Suboxone maintenance treatment complicating pregnancy, antepartum  -     US MFM Procedure ([facility])-Future; Future    High risk pregnancy, antepartum  -     CBC Without Differential  -     HIV 1/2 Ag/Ab (4th Gen)  -     RPR  -     HEPATITIS C ANTIBODY  -     US MFM Procedure ([facility])-Future; Future      Plan:   1. Routine prenatal care  - Doing well, no complaints  - Would like letter for enrollment into detox program which will be provided for patient by Dr. Bradley  - Considering bottle feeding post partum  - Would like depo provera for contraception  - Tdap: desires, ordered today  - T3 labs, to be completed today  - Will re order hepatitis C antibody, to be collected today  - Follow up MFM US given suboxone use, ordered     follow up in 2 Weeks, kick counts, labor precautions

## 2019-04-22 NOTE — PROGRESS NOTES
Seen and examined.  Agree with note.  All questions answered.    Letter given to start suboxone therapy program.    Patient desires immediate post-placental Mirena.    Patient endorses heroin use through most of this pregnancy.  UDS on L&D.  Notify Peds and SW on L&D.

## 2019-04-22 NOTE — LETTER
April 22, 2019    Ashley Lopez  [address] [place] [address]              [facility]  [address] [facility] [place] [address]  Phone: [phone]    To Whom It May Concern:    Ms. Lopez was at [facility] on 4/22/2019 and is currently under our care for pregnancy.  Estimated Date of Delivery: 5/30/2019.    She may proceed with opioid agonist therapy.  Opioid agonist therapy, particularly buprenorphine, is the preferred method for management of opioid dependence in pregnancy.      Sincerely,          GARRICK Bradley MD

## 2019-04-23 ENCOUNTER — TELEPHONE (OUTPATIENT)
Dept: MATERNAL FETAL MEDICINE | Facility: CLINIC | Age: 29
End: 2019-04-23

## 2019-04-23 LAB
HCV AB SERPL QL IA: POSITIVE
HIV 1+2 AB+HIV1 P24 AG SERPL QL IA: NEGATIVE
RPR SER QL: NORMAL

## 2019-04-23 NOTE — TELEPHONE ENCOUNTER
Attempting to schedule follow up growth ultrasound. Voicemail box full on cell; home phone does not have VM available.

## 2019-04-24 ENCOUNTER — TELEPHONE (OUTPATIENT)
Dept: OBSTETRICS AND GYNECOLOGY | Facility: CLINIC | Age: 29
End: 2019-04-24

## 2019-04-24 DIAGNOSIS — F19.10 HIGH RISK PREGNANCY DUE TO MATERNAL DRUG ABUSE IN THIRD TRIMESTER: Primary | ICD-10-CM

## 2019-04-24 DIAGNOSIS — O99.323 HIGH RISK PREGNANCY DUE TO MATERNAL DRUG ABUSE IN THIRD TRIMESTER: Primary | ICD-10-CM

## 2019-04-24 PROBLEM — B18.2 CHRONIC HEPATITIS C AFFECTING PREGNANCY, ANTEPARTUM: Status: ACTIVE | Noted: 2019-04-24

## 2019-04-24 PROBLEM — O98.419 CHRONIC HEPATITIS C AFFECTING PREGNANCY, ANTEPARTUM: Status: ACTIVE | Noted: 2019-04-24

## 2019-04-24 RX ORDER — SWAB
1 SWAB, NON-MEDICATED MISCELLANEOUS DAILY
Qty: 30 TABLET | Refills: 9 | Status: SHIPPED | OUTPATIENT
Start: 2019-04-24 | End: 2020-02-18

## 2019-04-24 NOTE — TELEPHONE ENCOUNTER
----- Message from Mar Curry MA sent at 4/23/2019  3:22 PM CDT -----  Patient needs prenatals sent to the pharmacy. Also asking for a referrel for a doc to give subuxone rx

## 2019-04-24 NOTE — TELEPHONE ENCOUNTER
Attempted to contact patient regarding prenatals sent to the pharmacy and information from Dr. Bradley for treatment facilities request by patient. Vm was full. Unable to leave message.

## 2019-04-24 NOTE — TELEPHONE ENCOUNTER
----- Message from Mar Curry MA sent at 4/24/2019 11:48 AM CDT -----  Spoke to patient and provided the following resources for treatment per Dr. Bradley:      [facility]   NEL Larson   [phone]     [facility]   [phone]     [facility]   [facility]   [phone]

## 2019-04-29 ENCOUNTER — TELEPHONE (OUTPATIENT)
Dept: OBSTETRICS AND GYNECOLOGY | Facility: CLINIC | Age: 29
End: 2019-04-29

## 2019-05-22 ENCOUNTER — ANESTHESIA EVENT (OUTPATIENT)
Dept: OBSTETRICS AND GYNECOLOGY | Facility: OTHER | Age: 29
End: 2019-05-22
Payer: MEDICAID

## 2019-05-22 ENCOUNTER — ANESTHESIA (OUTPATIENT)
Dept: OBSTETRICS AND GYNECOLOGY | Facility: OTHER | Age: 29
End: 2019-05-22
Payer: MEDICAID

## 2019-05-22 ENCOUNTER — HOSPITAL ENCOUNTER (INPATIENT)
Facility: OTHER | Age: 29
LOS: 2 days | Discharge: HOME | End: 2019-05-24
Attending: OBSTETRICS & GYNECOLOGY | Admitting: OBSTETRICS & GYNECOLOGY
Payer: MEDICAID

## 2019-05-22 DIAGNOSIS — Z37.9 NORMAL LABOR: ICD-10-CM

## 2019-05-22 PROCEDURE — 59025 FETAL NON-STRESS TEST: CPT

## 2019-05-22 PROCEDURE — 72100002 HC LABOR CARE, 1ST 8 HOURS

## 2019-05-22 PROCEDURE — 59409 PRA ETRICAL CARE,VAG DELIV ONLY: ICD-10-PCS | Mod: AA,,, | Performed by: ANESTHESIOLOGY

## 2019-05-22 PROCEDURE — 86703 HIV-1/HIV-2 1 RESULT ANTBDY: CPT

## 2019-05-22 PROCEDURE — 72200004 HC VAGINAL DELIVERY LEVEL I

## 2019-05-22 PROCEDURE — 11000001 HC ACUTE MED/SURG PRIVATE ROOM

## 2019-05-22 PROCEDURE — 59409 PR OBSTETRICAL CARE,VAG DELIV ONLY: ICD-10-PCS | Mod: AT,,, | Performed by: OBSTETRICS & GYNECOLOGY

## 2019-05-22 PROCEDURE — 59409 OBSTETRICAL CARE: CPT | Mod: AA,,, | Performed by: ANESTHESIOLOGY

## 2019-05-22 PROCEDURE — 0502F PR SUBSEQUENT PRENATAL CARE: ICD-10-PCS | Mod: ,,, | Performed by: OBSTETRICS & GYNECOLOGY

## 2019-05-22 PROCEDURE — 99900035 HC TECH TIME PER 15 MIN (STAT)

## 2019-05-22 PROCEDURE — 0502F SUBSEQUENT PRENATAL CARE: CPT | Mod: ,,, | Performed by: OBSTETRICS & GYNECOLOGY

## 2019-05-22 PROCEDURE — 63600175 PHARM REV CODE 636 W HCPCS

## 2019-05-22 PROCEDURE — 25000003 PHARM REV CODE 250

## 2019-05-22 PROCEDURE — 59409 OBSTETRICAL CARE: CPT | Mod: AT,,, | Performed by: OBSTETRICS & GYNECOLOGY

## 2019-05-22 PROCEDURE — 25000003 PHARM REV CODE 250: Performed by: ANESTHESIOLOGY

## 2019-05-22 PROCEDURE — 27200710 HC EPIDURAL INFUSION PUMP SET: Performed by: ANESTHESIOLOGY

## 2019-05-22 PROCEDURE — 99285 EMERGENCY DEPT VISIT HI MDM: CPT | Mod: 25

## 2019-05-22 PROCEDURE — 86901 BLOOD TYPING SEROLOGIC RH(D): CPT

## 2019-05-22 PROCEDURE — 62326 NJX INTERLAMINAR LMBR/SAC: CPT | Performed by: ANESTHESIOLOGY

## 2019-05-22 PROCEDURE — 27800517 HC TRAY,EPIDURAL-CONTINUOUS: Performed by: ANESTHESIOLOGY

## 2019-05-22 PROCEDURE — 63600175 PHARM REV CODE 636 W HCPCS: Performed by: OBSTETRICS & GYNECOLOGY

## 2019-05-22 RX ORDER — SODIUM CHLORIDE 0.9 % (FLUSH) 0.9 %
3 SYRINGE (ML) INJECTION EVERY 8 HOURS
Status: DISCONTINUED | OUTPATIENT
Start: 2019-05-22 | End: 2019-05-24 | Stop reason: HOSPADM

## 2019-05-22 RX ORDER — ONDANSETRON 8 MG/1
8 TABLET, ORALLY DISINTEGRATING ORAL EVERY 8 HOURS PRN
Status: DISCONTINUED | OUTPATIENT
Start: 2019-05-22 | End: 2019-05-24 | Stop reason: HOSPADM

## 2019-05-22 RX ORDER — METHYLERGONOVINE MALEATE 0.2 MG/ML
INJECTION INTRAVENOUS
Status: DISCONTINUED
Start: 2019-05-22 | End: 2019-05-22 | Stop reason: WASHOUT

## 2019-05-22 RX ORDER — LIDOCAINE HYDROCHLORIDE 10 MG/ML
INJECTION INFILTRATION; PERINEURAL
Status: COMPLETED
Start: 2019-05-22 | End: 2019-05-22

## 2019-05-22 RX ORDER — OXYTOCIN/RINGER'S LACTATE 20/1000 ML
10 PLASTIC BAG, INJECTION (ML) INTRAVENOUS ONCE
Status: DISCONTINUED | OUTPATIENT
Start: 2019-05-22 | End: 2019-05-24 | Stop reason: HOSPADM

## 2019-05-22 RX ORDER — SODIUM CHLORIDE, SODIUM LACTATE, POTASSIUM CHLORIDE, CALCIUM CHLORIDE 600; 310; 30; 20 MG/100ML; MG/100ML; MG/100ML; MG/100ML
INJECTION, SOLUTION INTRAVENOUS CONTINUOUS
Status: DISCONTINUED | OUTPATIENT
Start: 2019-05-22 | End: 2019-05-24 | Stop reason: HOSPADM

## 2019-05-22 RX ORDER — LIDOCAINE HYDROCHLORIDE AND EPINEPHRINE 15; 5 MG/ML; UG/ML
INJECTION, SOLUTION EPIDURAL
Status: DISCONTINUED | OUTPATIENT
Start: 2019-05-22 | End: 2019-05-22

## 2019-05-22 RX ORDER — SODIUM CHLORIDE 9 MG/ML
INJECTION, SOLUTION INTRAVENOUS
Status: DISCONTINUED | OUTPATIENT
Start: 2019-05-22 | End: 2019-05-24 | Stop reason: HOSPADM

## 2019-05-22 RX ORDER — FENTANYL/BUPIVACAINE/NS/PF 2MCG/ML-.1
PLASTIC BAG, INJECTION (ML) INJECTION CONTINUOUS PRN
Status: DISCONTINUED | OUTPATIENT
Start: 2019-05-22 | End: 2019-05-22

## 2019-05-22 RX ORDER — CARBOPROST TROMETHAMINE 250 UG/ML
INJECTION, SOLUTION INTRAMUSCULAR
Status: DISCONTINUED
Start: 2019-05-22 | End: 2019-05-22 | Stop reason: WASHOUT

## 2019-05-22 RX ORDER — MISOPROSTOL 200 UG/1
TABLET ORAL
Status: COMPLETED
Start: 2019-05-22 | End: 2019-05-22

## 2019-05-22 RX ORDER — FENTANYL/BUPIVACAINE/NS/PF 2MCG/ML-.1
PLASTIC BAG, INJECTION (ML) INJECTION
Status: DISPENSED
Start: 2019-05-22 | End: 2019-05-23

## 2019-05-22 RX ORDER — OXYTOCIN/RINGER'S LACTATE 20/1000 ML
333 PLASTIC BAG, INJECTION (ML) INTRAVENOUS CONTINUOUS
Status: ACTIVE | OUTPATIENT
Start: 2019-05-22 | End: 2019-05-22

## 2019-05-22 RX ORDER — MISOPROSTOL 200 UG/1
600 TABLET ORAL
Status: DISCONTINUED | OUTPATIENT
Start: 2019-05-22 | End: 2019-05-24 | Stop reason: HOSPADM

## 2019-05-22 RX ORDER — OXYTOCIN 10 [USP'U]/ML
INJECTION, SOLUTION INTRAMUSCULAR; INTRAVENOUS
Status: DISCONTINUED
Start: 2019-05-22 | End: 2019-05-22 | Stop reason: WASHOUT

## 2019-05-22 RX ORDER — OXYTOCIN/RINGER'S LACTATE 20/1000 ML
PLASTIC BAG, INJECTION (ML) INTRAVENOUS
Status: DISPENSED
Start: 2019-05-22 | End: 2019-05-23

## 2019-05-22 RX ORDER — OXYTOCIN/RINGER'S LACTATE 20/1000 ML
41.7 PLASTIC BAG, INJECTION (ML) INTRAVENOUS CONTINUOUS
Status: ACTIVE | OUTPATIENT
Start: 2019-05-22 | End: 2019-05-23

## 2019-05-22 RX ADMIN — Medication 10 ML/HR: at 09:05

## 2019-05-22 RX ADMIN — MISOPROSTOL 800 MCG: 200 TABLET ORAL at 10:05

## 2019-05-22 RX ADMIN — AMPICILLIN SODIUM 2 G: 2 INJECTION, POWDER, FOR SOLUTION INTRAMUSCULAR; INTRAVENOUS at 09:05

## 2019-05-22 RX ADMIN — LIDOCAINE HYDROCHLORIDE 200 MG: 10 INJECTION, SOLUTION INFILTRATION; PERINEURAL at 10:05

## 2019-05-22 RX ADMIN — Medication 41.7 MILLI-UNITS/MIN: at 11:05

## 2019-05-22 RX ADMIN — LIDOCAINE HYDROCHLORIDE,EPINEPHRINE BITARTRATE 3 ML: 15; .005 INJECTION, SOLUTION EPIDURAL; INFILTRATION; INTRACAUDAL; PERINEURAL at 09:05

## 2019-05-23 LAB
ABO + RH BLD: NORMAL
ALBUMIN SERPL BCP-MCNC: 2.5 G/DL (ref 3.5–5.2)
ALP SERPL-CCNC: 298 U/L (ref 55–135)
ALT SERPL W/O P-5'-P-CCNC: 22 U/L (ref 10–44)
AMPHET+METHAMPHET UR QL: NEGATIVE
ANION GAP SERPL CALC-SCNC: 14 MMOL/L (ref 8–16)
ANISOCYTOSIS BLD QL SMEAR: SLIGHT
AST SERPL-CCNC: 26 U/L (ref 10–40)
BARBITURATES UR QL SCN>200 NG/ML: NEGATIVE
BASOPHILS # BLD AUTO: 0.01 K/UL (ref 0–0.2)
BASOPHILS # BLD AUTO: ABNORMAL K/UL (ref 0–0.2)
BASOPHILS NFR BLD: 0 % (ref 0–1.9)
BASOPHILS NFR BLD: 0.1 % (ref 0–1.9)
BENZODIAZ UR QL SCN>200 NG/ML: NEGATIVE
BILIRUB SERPL-MCNC: 0.4 MG/DL (ref 0.1–1)
BLD GP AB SCN CELLS X3 SERPL QL: NORMAL
BUN SERPL-MCNC: 6 MG/DL (ref 6–20)
BZE UR QL SCN: NORMAL
CALCIUM SERPL-MCNC: 9 MG/DL (ref 8.7–10.5)
CANNABINOIDS UR QL SCN: NORMAL
CHLORIDE SERPL-SCNC: 103 MMOL/L (ref 95–110)
CO2 SERPL-SCNC: 18 MMOL/L (ref 23–29)
CREAT SERPL-MCNC: 0.7 MG/DL (ref 0.5–1.4)
CREAT UR-MCNC: 129 MG/DL (ref 15–325)
DIFFERENTIAL METHOD: ABNORMAL
DIFFERENTIAL METHOD: ABNORMAL
EOSINOPHIL # BLD AUTO: 0 K/UL (ref 0–0.5)
EOSINOPHIL # BLD AUTO: ABNORMAL K/UL (ref 0–0.5)
EOSINOPHIL NFR BLD: 0 % (ref 0–8)
EOSINOPHIL NFR BLD: 0 % (ref 0–8)
ERYTHROCYTE [DISTWIDTH] IN BLOOD BY AUTOMATED COUNT: 15.6 % (ref 11.5–14.5)
ERYTHROCYTE [DISTWIDTH] IN BLOOD BY AUTOMATED COUNT: 15.6 % (ref 11.5–14.5)
EST. GFR  (AFRICAN AMERICAN): >60 ML/MIN/1.73 M^2
EST. GFR  (NON AFRICAN AMERICAN): >60 ML/MIN/1.73 M^2
ETHANOL UR-MCNC: <10 MG/DL
GLUCOSE SERPL-MCNC: 109 MG/DL (ref 70–110)
HCT VFR BLD AUTO: 28.5 % (ref 37–48.5)
HCT VFR BLD AUTO: 36.1 % (ref 37–48.5)
HGB BLD-MCNC: 11.7 G/DL (ref 12–16)
HGB BLD-MCNC: 9.2 G/DL (ref 12–16)
HIV1+2 IGG SERPL QL IA.RAPID: NEGATIVE
LYMPHOCYTES # BLD AUTO: 2.1 K/UL (ref 1–4.8)
LYMPHOCYTES # BLD AUTO: ABNORMAL K/UL (ref 1–4.8)
LYMPHOCYTES NFR BLD: 17.9 % (ref 18–48)
LYMPHOCYTES NFR BLD: 3 % (ref 18–48)
MCH RBC QN AUTO: 28.1 PG (ref 27–31)
MCH RBC QN AUTO: 28.6 PG (ref 27–31)
MCHC RBC AUTO-ENTMCNC: 32.3 G/DL (ref 32–36)
MCHC RBC AUTO-ENTMCNC: 32.4 G/DL (ref 32–36)
MCV RBC AUTO: 87 FL (ref 82–98)
MCV RBC AUTO: 88 FL (ref 82–98)
METHADONE UR QL SCN>300 NG/ML: NEGATIVE
MONOCYTES # BLD AUTO: 0.8 K/UL (ref 0.3–1)
MONOCYTES # BLD AUTO: ABNORMAL K/UL (ref 0.3–1)
MONOCYTES NFR BLD: 1 % (ref 4–15)
MONOCYTES NFR BLD: 6.6 % (ref 4–15)
NEUTROPHILS # BLD AUTO: 8.6 K/UL (ref 1.8–7.7)
NEUTROPHILS NFR BLD: 75.1 % (ref 38–73)
NEUTROPHILS NFR BLD: 86 % (ref 38–73)
NEUTS BAND NFR BLD MANUAL: 10 %
OPIATES UR QL SCN: NORMAL
PCP UR QL SCN>25 NG/ML: NEGATIVE
PLATELET # BLD AUTO: 283 K/UL (ref 150–350)
PLATELET # BLD AUTO: 301 K/UL (ref 150–350)
PMV BLD AUTO: 9.9 FL (ref 9.2–12.9)
PMV BLD AUTO: 9.9 FL (ref 9.2–12.9)
POLYCHROMASIA BLD QL SMEAR: ABNORMAL
POTASSIUM SERPL-SCNC: 4.4 MMOL/L (ref 3.5–5.1)
PROT SERPL-MCNC: 7.2 G/DL (ref 6–8.4)
RBC # BLD AUTO: 3.27 M/UL (ref 4–5.4)
RBC # BLD AUTO: 4.09 M/UL (ref 4–5.4)
SODIUM SERPL-SCNC: 135 MMOL/L (ref 136–145)
TOXICOLOGY INFORMATION: NORMAL
WBC # BLD AUTO: 11.44 K/UL (ref 3.9–12.7)
WBC # BLD AUTO: 12.29 K/UL (ref 3.9–12.7)

## 2019-05-23 PROCEDURE — 51702 INSERT TEMP BLADDER CATH: CPT

## 2019-05-23 PROCEDURE — 25000003 PHARM REV CODE 250: Performed by: STUDENT IN AN ORGANIZED HEALTH CARE EDUCATION/TRAINING PROGRAM

## 2019-05-23 PROCEDURE — 80053 COMPREHEN METABOLIC PANEL: CPT

## 2019-05-23 PROCEDURE — 25000003 PHARM REV CODE 250: Performed by: OBSTETRICS & GYNECOLOGY

## 2019-05-23 PROCEDURE — 99232 PR SUBSEQUENT HOSPITAL CARE,LEVL II: ICD-10-PCS | Mod: ,,, | Performed by: OBSTETRICS & GYNECOLOGY

## 2019-05-23 PROCEDURE — 85027 COMPLETE CBC AUTOMATED: CPT

## 2019-05-23 PROCEDURE — 36415 COLL VENOUS BLD VENIPUNCTURE: CPT

## 2019-05-23 PROCEDURE — 11000001 HC ACUTE MED/SURG PRIVATE ROOM

## 2019-05-23 PROCEDURE — 99232 SBSQ HOSP IP/OBS MODERATE 35: CPT | Mod: ,,, | Performed by: OBSTETRICS & GYNECOLOGY

## 2019-05-23 PROCEDURE — 80307 DRUG TEST PRSMV CHEM ANLYZR: CPT

## 2019-05-23 PROCEDURE — 85007 BL SMEAR W/DIFF WBC COUNT: CPT

## 2019-05-23 PROCEDURE — 85025 COMPLETE CBC W/AUTO DIFF WBC: CPT

## 2019-05-23 RX ORDER — MEDROXYPROGESTERONE ACETATE 150 MG/ML
150 INJECTION, SUSPENSION INTRAMUSCULAR ONCE
Status: DISCONTINUED | OUTPATIENT
Start: 2019-05-23 | End: 2019-05-24 | Stop reason: SDUPTHER

## 2019-05-23 RX ORDER — HYDROCODONE BITARTRATE AND ACETAMINOPHEN 10; 325 MG/1; MG/1
1 TABLET ORAL EVERY 4 HOURS PRN
Status: DISCONTINUED | OUTPATIENT
Start: 2019-05-23 | End: 2019-05-24 | Stop reason: HOSPADM

## 2019-05-23 RX ORDER — ONDANSETRON 8 MG/1
8 TABLET, ORALLY DISINTEGRATING ORAL EVERY 8 HOURS PRN
Status: DISCONTINUED | OUTPATIENT
Start: 2019-05-23 | End: 2019-05-23

## 2019-05-23 RX ORDER — HYDROCODONE BITARTRATE AND ACETAMINOPHEN 5; 325 MG/1; MG/1
1 TABLET ORAL EVERY 4 HOURS PRN
Status: DISCONTINUED | OUTPATIENT
Start: 2019-05-23 | End: 2019-05-24 | Stop reason: HOSPADM

## 2019-05-23 RX ORDER — DIPHENHYDRAMINE HCL 25 MG
25 CAPSULE ORAL EVERY 4 HOURS PRN
Status: DISCONTINUED | OUTPATIENT
Start: 2019-05-23 | End: 2019-05-24 | Stop reason: HOSPADM

## 2019-05-23 RX ORDER — ACETAMINOPHEN 325 MG/1
650 TABLET ORAL EVERY 6 HOURS PRN
Status: DISCONTINUED | OUTPATIENT
Start: 2019-05-23 | End: 2019-05-24 | Stop reason: HOSPADM

## 2019-05-23 RX ORDER — BUPRENORPHINE 2 MG/1
2 TABLET SUBLINGUAL DAILY
Status: DISCONTINUED | OUTPATIENT
Start: 2019-05-23 | End: 2019-05-24 | Stop reason: HOSPADM

## 2019-05-23 RX ORDER — IBUPROFEN 600 MG/1
600 TABLET ORAL EVERY 6 HOURS
Status: DISCONTINUED | OUTPATIENT
Start: 2019-05-23 | End: 2019-05-24 | Stop reason: HOSPADM

## 2019-05-23 RX ORDER — HYDROCORTISONE 25 MG/G
CREAM TOPICAL 3 TIMES DAILY PRN
Status: DISCONTINUED | OUTPATIENT
Start: 2019-05-23 | End: 2019-05-24 | Stop reason: HOSPADM

## 2019-05-23 RX ORDER — DIPHENHYDRAMINE HYDROCHLORIDE 50 MG/ML
25 INJECTION INTRAMUSCULAR; INTRAVENOUS EVERY 4 HOURS PRN
Status: DISCONTINUED | OUTPATIENT
Start: 2019-05-23 | End: 2019-05-24 | Stop reason: HOSPADM

## 2019-05-23 RX ORDER — OXYTOCIN/RINGER'S LACTATE 20/1000 ML
41.65 PLASTIC BAG, INJECTION (ML) INTRAVENOUS CONTINUOUS
Status: ACTIVE | OUTPATIENT
Start: 2019-05-23 | End: 2019-05-23

## 2019-05-23 RX ORDER — DOCUSATE SODIUM 100 MG/1
200 CAPSULE, LIQUID FILLED ORAL 2 TIMES DAILY PRN
Status: DISCONTINUED | OUTPATIENT
Start: 2019-05-23 | End: 2019-05-24 | Stop reason: HOSPADM

## 2019-05-23 RX ADMIN — IBUPROFEN 600 MG: 600 TABLET ORAL at 01:05

## 2019-05-23 RX ADMIN — IBUPROFEN 600 MG: 600 TABLET ORAL at 12:05

## 2019-05-23 RX ADMIN — ONDANSETRON 8 MG: 8 TABLET, ORALLY DISINTEGRATING ORAL at 09:05

## 2019-05-23 RX ADMIN — BUPRENORPHINE 2 MG: 2 TABLET SUBLINGUAL at 08:05

## 2019-05-23 RX ADMIN — IBUPROFEN 600 MG: 600 TABLET ORAL at 09:05

## 2019-05-23 RX ADMIN — DOCUSATE SODIUM 200 MG: 100 CAPSULE, LIQUID FILLED ORAL at 08:05

## 2019-05-23 RX ADMIN — IBUPROFEN 600 MG: 600 TABLET ORAL at 08:05

## 2019-05-23 RX ADMIN — BUPRENORPHINE 2 MG: 2 TABLET SUBLINGUAL at 12:05

## 2019-05-23 RX ADMIN — HYDROCODONE BITARTRATE AND ACETAMINOPHEN 1 TABLET: 10; 325 TABLET ORAL at 09:05

## 2019-05-23 RX ADMIN — DOCUSATE SODIUM 200 MG: 100 CAPSULE, LIQUID FILLED ORAL at 09:05

## 2019-05-23 NOTE — PROGRESS NOTES
POSTPARTUM PROGRESS NOTE     Ashley Lopez is a 29 y.o. female PPD #1 status post Spontaneous vaginal delivery at 39w0d in a pregnancy complicated by IVDU (street suboxone) and +Utox. Patient is doing well this morning. She denies nausea, vomiting, fever or chills.  Patient reports mild abdominal pain that is well relieved by oral pain medications. Lochia is mild and decreasing. Patient is voiding without difficulty and ambulating with no difficulty. She has passed flatus, and has not had BM.  Patient does not plan to breast feed. Considering depo provera for contraception. Initially desired IUD however, was unable to receive in immediate post partum period, amenable to Nexplanon if able to obtain for patient. She desires circumcision however baby in NICU.     Objective:       Temp:  [96.6 °F (35.9 °C)-98.9 °F (37.2 °C)] 98.9 °F (37.2 °C)  Pulse:  [60-86] 71  Resp:  [18-20] 18  SpO2:  [90 %-100 %] 99 %  BP: ()/(51-84) 103/59    General:   alert, appears stated age and cooperative   Lungs:   clear to auscultation bilaterally   Heart:   regular rate and rhythm, S1, S2 normal, no murmur, click, rub or gallop   Abdomen:  soft, non-tender; bowel sounds normal; no masses,  no organomegaly   Uterus:  firm located at the umblicus.    Extremities: peripheral pulses normal, no pedal edema, no clubbing or cyanosis     Lab Review  No results found for this or any previous visit (from the past 4 hour(s)).    I/O    Intake/Output Summary (Last 24 hours) at 5/23/2019 0635  Last data filed at 5/23/2019 0100  Gross per 24 hour   Intake --   Output 444 ml   Net -444 ml        Assessment:     Patient Active Problem List   Diagnosis    Suboxone maintenance treatment complicating pregnancy, antepartum    High risk pregnancy due to maternal drug abuse, antepartum    High risk pregnancy, antepartum    Encounter for initial prescription of intrauterine contraceptive device (IUD)    Chronic hepatitis C affecting pregnancy,  antepartum    Normal labor        Plan:   1. Postpartum care:  - Patient doing well. Continue routine management and advances.  - Continue PO pain meds. Pain well controlled.  - Heme: H/h: 11.4/36.5>11.7/36.1>p  - Encourage ambulation  - Circumcision - baby in NICU  - Contraception - will attempt to   - Lactation consult PRN    2. IVDU  - Was previously on street suboxone   - Utox: + cocaine, opiates, THC  - HIV negative  - Social work contacted, to see patient today     Dispo: As patient meets milestones, will plan to discharge PPD#1-2    Zehra Szymanski MD  OB/GYN  PGY-1

## 2019-05-23 NOTE — PLAN OF CARE
"  Copied from baby's NICU chart:    SOCIAL WORK DISCHARGE PLANNING ASSESSMENT     Sw completed discharge planning assessment with pt's mother in mother's room 608.  Pt's mother was guarded. Education on the role of  was provided. Emotional support provided throughout assessment.     Mom reported daily heroin use for "a long time." Mom reported she tried to discontinue heroin but no programs would take her after 30 weeks of pregnancy. Mom reported she uses THC a couple of times per week and that she "did not really use" cocaine so she is unsure how that was in she and pt's system. Mom reported she would like to go to rehab but unsure what facility. Mom is unsure about her d/c plans at this time.      Educated mom on mandated reporting. Mom reported she assumes that pt will not be allowed to d/c with her. If that is the case, mom would [place] [facility]        Legal Name: Ben Lopez                         :  2019         Patient Active Problem List   Diagnosis    Meconium aspiration pneumonia of right lung    Single live     Thick meconium stained amniotic fluid    Pneumothorax on left            [facility]irth [facility]           TALON: 2019     Birth Weight:   No birth weight on file.                        Birth Length: 46.5 cm              Gestational Age: 38w6d           Apgars    Living status:  Living  Apgars:   1 min.:   5 min.:   10 min.:   15 min.:   20 min.:     Skin color:   0  1          Heart rate:   2  2          Reflex irritability:   1  2          Muscle tone:   2  2          Respiratory effort:   2  2          Total:   7  9          Apgars assigned by:  NICU            Mother: Ashley Lopez, 1990, 30 y/o  Address: unknown at this time  Phone: [phone]  Employer: unknown                 Job Title: n/a  Education: 10th grade-reported no difficulties with reading, writing or comprehension.        Father: FOB is not being " listed.     Signed Birth Certificate: no     Support person(s): Kimmy Lopez (mgm) [phone] and Pardeep Lopez (maternal uncle) [phone]     Sibling(s): none     Spiritual Affiliation: None     Commercial Insurance Coverage: No     [place] Primary: Yes Secondary: No   [facility]      Pediatrician: Instructed to decide soon and inform RN       Nutrition: Formula               Breast Pump:              N/A               WIC:              Mom not certified; however will apply for         Essential Items: (includes car seat, crib/bassinet/pack-n-play, clothing, bottles, diapers, etc.)  Plans to acquire by discharge      Transportation: Family/friends and Medicaid transportation      Education: Information given on CPR classes and Physician/NNP daily rounds.      Resources Given: [facility] [employer], [facility], Medicaid transportation, Immunizations, Glossary of Commonly Used Terms, SSI Benefits, Preparing for Your Baby's Discharge Home, Support Resources for NICU Families, Insurance Coverage of Breast Pumps and Supplies, Breast Pumps through [facility], [facility], [facility], and [facility] [place].        Potential Eligibility for SSI Benefits: No     Potential Discharge Needs:  [facility]   DCFS report for  drug exposure        Vika Nguyễn LCSW     [facility]  [email or web address]     (phone) [phone] or  Bae. [phone]  (fax) [phone]

## 2019-05-23 NOTE — ANESTHESIA POSTPROCEDURE EVALUATION
Anesthesia Post Evaluation    Patient: Ashley Lopez    Procedure(s) Performed: * No procedures listed *    Final Anesthesia Type: epidural  Patient location during evaluation: floor  Patient participation: Yes- Able to Participate  Level of consciousness: awake and alert and oriented  Post-procedure vital signs: reviewed and stable  Pain management: adequate  Airway patency: patent  PONV status at discharge: No PONV  Anesthetic complications: no      Cardiovascular status: blood pressure returned to baseline and hemodynamically stable  Respiratory status: unassisted, spontaneous ventilation and room air  Hydration status: euvolemic  Follow-up not needed.          Vitals Value Taken Time   /64 5/23/2019  7:45 AM   Temp 37.6 °C (99.7 °F) 5/23/2019  7:45 AM   Pulse 71 5/23/2019  7:45 AM   Resp 17 5/23/2019  7:45 AM   SpO2 97 % 5/23/2019  7:45 AM         No case tracking events are documented in the log.      Pain/Angle Score: Pain Rating Prior to Med Admin: 7 (5/23/2019  8:10 AM)  Pain Rating Post Med Admin: 0 (5/23/2019  9:00 AM)

## 2019-05-23 NOTE — PLAN OF CARE
"Pt reported daily heroin use for "a long time." Pt reported she tried to discontinue heroin but no programs would take her after 30 weeks of pregnancy. Pt reported she uses THC a couple of times per week and that she "did not really use" cocaine so she is unsure how that was in she and baby's system. Pt reported she would like to go to rehab but unsure what facility. Pt is unsure about her d/c plans at this time. Pt did not want sw to contact [facility] for details about their detox program. Pt aware that this [facility] does not provide subutex/suboxone rxs at time of d/c.     Pt believes she is starting to have some opiate withdrawal symptoms. Pt is feeling nauseous and did vomit some earlier. Pt nurse notified.    Will f/u with pt tomorrow regarding a d/c plan.    Vika Nguyễn LCSW    [facility] [place]  [email or web address]    (phone) [phone] or  Ext. [phone]  (fax) [phone]    "

## 2019-05-23 NOTE — PROGRESS NOTES
Pt ambulating and voiding without difficulty. Patient safety maintained, side rails up, bed low and locked position.  Pain well controlled with scheduled pain medication. Fundus midline, firm, with moderate lochia. VSS. Significant other at bedside; baby in NICU. Will continue to monitor.

## 2019-05-23 NOTE — ANESTHESIA PROCEDURE NOTES
Epidural    Patient location during procedure: OB   Reason for block: primary anesthetic   Diagnosis: IUP   Start time: 5/22/2019 10:55 PM  Timeout: 5/22/2019 10:55 PM  End time: 5/22/2019 11:10 PM  Surgery related to: Vaginal Delivery  Staffing  Anesthesiologist: Alejandra Cloud MD  Resident/CRNA: Jacoby Orlando MD  Performed: resident/CRNA   Preanesthetic Checklist  Completed: patient identified, site marked, surgical consent, pre-op evaluation, timeout performed, IV checked, risks and benefits discussed, monitors and equipment checked, anesthesia consent given, hand hygiene performed and patient being monitored  Preparation  Patient position: sitting  Prep: ChloraPrep  Patient monitoring: Pulse Ox  Epidural  Skin Anesthetic: lidocaine 1%  Skin Wheal: 3 mL  Administration type: continuous  Approach: midline  Interspace: L3-4    Injection technique: MATTHEW saline  Needle and Epidural Catheter  Needle type: Tuohy   Needle gauge: 17  Needle length: 3.5 inches  Needle insertion depth: 6 cm  Catheter type: springwound  Catheter size: 19 G  Catheter at skin depth: 10 cm  Test dose: 3 mL of lidocaine 1.5% with Epi 1-to-200,000  Additional Documentation: incremental injection, negative aspiration for heme and CSF, no paresthesia on injection, no signs/symptoms of IV or SA injection, no significant pain on injection and no significant complaints from patient  Needle localization: anatomical landmarks  Medications:  Volume per aspiration: 5 mL  Time between aspirations: 5 minutes  Assessment  Ease of block: easy  Patient's tolerance of the procedure: comfortable throughout block and no complaints  Additional Notes  Bolused 10 mL from epidural bag Dural puncture performed with spinal needle.

## 2019-05-23 NOTE — ANESTHESIA PREPROCEDURE EVALUATION
2019  Pre-operative evaluation for * No procedures listed *    Ashley Lopez is a 29 y.o. female  [id number] at 38w6d who arrives via EMS complaining of ctx q5 mins. Pt is IV drug abuser, last used heroin yesterday. This IUP is complicated by chronic hepatitis C infection.     Prev airway:   Present Prior to Hospital Arrival?: No; Placement Date: 16; Placement Time: 1558; Method of Intubation: Direct laryngoscopy; Inserted by: CRNA; Airway Device: Endotracheal Tube; Mask Ventilation: Easy; Intubated: Postinduction; Blade: Haq #2; Style: Cuffed; Cuff Inflation: Minimal occlusive pressure; Inflation Amount: 5; Placement Verified By: Auscultation, Capnometry; Grade: Grade I; Complicating Factors: None; Intubation Findings: Positive EtCO2, Bilateral breath sounds, Atraumatic/Condition of teeth unchanged;  Depth of Insertion: 20; Securment: Lips; Complications: None; Breath Sounds: Equal Bilateral; Insertion Attempts: 1; Removal Date: 16;  Removal Time: 1658    Drips: None    Patient Active Problem List   Diagnosis    Suboxone maintenance treatment complicating pregnancy, antepartum    High risk pregnancy due to maternal drug abuse, antepartum    High risk pregnancy, antepartum    Encounter for initial prescription of intrauterine contraceptive device (IUD)    Chronic hepatitis C affecting pregnancy, antepartum    Normal labor       Review of patient's allergies indicates:  No Known Allergies     No current facility-administered medications on file prior to encounter.      Current Outpatient Medications on File Prior to Encounter   Medication Sig Dispense Refill    amoxicillin (AMOXIL) 500 MG capsule TAKE ONE CAPSULE BY MOUTH THREE TIMES DAILY UNTIL GONE  0    buprenorphine-naloxone 2-0.5 mg (SUBOXONE) 2-0.5 mg Subl Place under the tongue every 6 (six) hours as needed.      prenatal  vit-iron fum-folic ac 28 mg iron- 800 mcg Tab Take 1 tablet by mouth once daily. 30 tablet 9    promethazine (PHENERGAN) 25 MG tablet Take 1 tablet (25 mg total) by mouth every 6 (six) hours as needed for Nausea. 30 tablet 1       Past Surgical History:   Procedure Laterality Date    CHOLECYSTECTOMY      CHOLECYSTECTOMY-LAPAROSCOPIC N/A 1/20/2016    Performed by Jose [facility]antes [facility] at [facility]       Social History     Socioeconomic History    Marital status: Single     Spouse name: Not on file    Number of children: Not on file    Years of education: Not on file    Highest education level: Not on file   Occupational History    Not on file   Social Needs    Financial resource strain: Not on file    Food insecurity:     Worry: Not on file     Inability: Not on file    Transportation needs:     Medical: Not on file     Non-medical: Not on file   Tobacco Use    Smoking status: Current Every Day Smoker     Packs/day: 0.50     Years: 8.00     Pack years: 4.00     Types: Cigarettes    Smokeless tobacco: Never Used   Substance and Sexual Activity    Alcohol use: No     Frequency: Never     Comment: occasionally; pre pregnancy    Drug use: Yes     Types: Marijuana     Comment: History of heroin and opiate abuse.    Sexual activity: Not on file   Lifestyle    Physical activity:     Days per week: Not on file     Minutes per session: Not on file    Stress: Not on file   Relationships    Social connections:     Talks on phone: Not on file     Gets together: Not on file     Attends Taoism service: Not on file     Active member of club or organization: Not on file     Attends meetings of clubs or organizations: Not on file     Relationship status: Not on file   Other Topics Concern    Not on file   Social History Narrative    Not on file         Vital Signs Range (Last 24H):  Temp:  [35.9 °C (96.6 °F)]   Pulse:  [69-86]   Resp:  [20]   BP: (116)/(74)   SpO2:  [90 %-100 %]       CBC: No results for  input(s): WBC, RBC, HGB, HCT, PLT, MCV, MCH, MCHC in the last 72 hours.    CMP: No results for input(s): NA, K, CL, CO2, BUN, CREATININE, GLU, MG, PHOS, CALCIUM, ALBUMIN, PROT, ALKPHOS, ALT, AST, BILITOT in the last 72 hours.    INR  No results for input(s): PT, INR, PROTIME, APTT in the last 72 hours.    EKG: None      2D Echo: None          Anesthesia Evaluation    I have reviewed the Patient Summary Reports.     I have reviewed the Medications.     Review of Systems  Anesthesia Hx:  Neg history of prior surgery.   Social:  Smoker    Hematology/Oncology:  Hematology Normal        Cardiovascular:  Cardiovascular Normal     Pulmonary:  Pulmonary Normal    Renal/:  Renal/ Normal     Hepatic/GI:  Hepatic/GI Normal    Endocrine:  Endocrine Normal        Physical Exam  General:  Well nourished    Airway/Jaw/Neck:  Airway Findings: Mouth Opening: Normal Tongue: Normal  General Airway Assessment: Adult  Mallampati: II  TM Distance: 4 - 6 cm  Jaw/Neck Findings:  Neck ROM: Normal ROM      Dental:  Dental Findings: In tact   Chest/Lungs:  Chest/Lungs Findings: Normal Respiratory Rate     Heart/Vascular:  Heart Findings: Rate: Normal        Mental Status:  Mental Status Findings:  Cooperative         Anesthesia Plan  Type of Anesthesia, risks & benefits discussed:  Anesthesia Type:  general, epidural, CSE, spinal  Patient's Preference:   Intra-op Monitoring Plan:   Intra-op Monitoring Plan Comments:   Post Op Pain Control Plan:   Post Op Pain Control Plan Comments:   Induction:   IV  Beta Blocker:  Patient is not currently on a Beta-Blocker (No further documentation required).       Informed Consent: Patient understands risks and agrees with Anesthesia plan.  Questions answered. Anesthesia consent signed with patient.  ASA Score: 2     Day of Surgery Review of History & Physical: I have interviewed and examined the patient. I have reviewed the patient's H&P dated:  There are no significant changes.  H&P update referred to  the provider.         Ready For Surgery From Anesthesia Perspective.

## 2019-05-23 NOTE — PLAN OF CARE
Spoke to pt and pt's mom. Pt is now expressing interest in [facility] detox. Contacted [facility]. They currently do not have detox beds available. Latha encouraged pt to call back at 9:30 am tomorrow to find out bed status. Pt ideally would like to d/c from [facility] and drive straight to [facility] for admission to detox. Explained to pt that this rarely happens and she should be thinking about an alternative discharge plan to help her so that she does not relapse prior to detox admission.    Will cont to f/u.     Vika Nguyễn LCSW    [facility] [place]  [email or web address]    (phone) [phone] or  Juj. [phone]  (fax) [phone]

## 2019-05-23 NOTE — L&D DELIVERY NOTE
After epidural placement, pt SROM'ed. Thick meconium noted, NICU notified. Baby noted to be at +2 station.   cephalic after approximately 10 minutes of maternal pushing.  Under epidural anesthesia.  Infant delivered OA  Nuchal x1 reduced at introitus.  Male infant also tolerated the delivery well and was placed on mothers abdomen for skin to skin and bulb suctioning performed.  Cord clamped and cut.  Umbilical venous blood obtained.  Placenta delivered spontaneously and IV pitocin given.  Uterine tone noted. No cervical lacerations.  Patient tolerated delivery well.  1st degree laceration repaired in usual fashion with 2-0 vicryl.  Left labial laceration repaired with interrupted sutures of 3-0 vicryl  Right periurethral repaired with interrupted sutures of 3-0 vicryl  EBL 250cc  Staff present for entire procedure.  S/L/N counts correct x2.    Sara Burgess MD   OBGYN, PGY-1       Delivery Information for  Gabe Lopez    Birth information:  YOB: 2019   Time of birth: 10:22 PM   Sex: male   Head Delivery Date/Time: 2019 10:21 PM   Delivery type: Vaginal, Spontaneous   Gestational Age: 38w6d    Delivery Providers    Delivering clinician:     Provider Role    Sara Burgess MD Resident    Sangita Lynn RN Delivery Nurse    Keshia Doll, RN Charge Nurse    Roxy Marinelli, ST Surgical Tech            Measurements    Weight:  2560g  Length:           Apgars    Living status:  Living  Apgars:   1 min.:   5 min.:   10 min.:   15 min.:   20 min.:     Skin color:   0  1       Heart rate:   2  2       Reflex irritability:   1  2       Muscle tone:   2  2       Respiratory effort:   2  2       Total:   7  9       Apgars assigned by:  NICU         Operative Delivery    Forceps attempted?:  No  Vacuum extractor attempted?:  No         Shoulder Dystocia    Shoulder dystocia present?:  No           Presentation    Presentation:  Vertex  Position:  Occiput Anterior            Interventions/Resuscitation    Method:  NICU Attended       Cord    Vessels:  3 vessels  Complications:  Nuchal  Nuchal Intervention:  reduced  Nuchal Cord Description:  loose nuchal cord  Number of Loops:  1  Delayed Cord Clamping?:  No  Cord Clamped Date/Time:  2019 10:22 PM  Cord Blood Disposition:  Sent with Baby  Gases Sent?:  No  Stem Cell Collection (by MD):  No       Placenta    Placenta delivery date/time:  2019  Placenta removal:  Spontaneous  Placenta appearance:  Intact  Placenta disposition:  discarded           Labor Events:       labor: No     Labor Onset Date/Time:         Dilation Complete Date/Time: 2019 22:12     Start Pushing Date/Time: 2019 22:15     Rupture Date/Time: 19         Rupture type:           Fluid Amount:        Fluid Color:        Fluid Odor:        Membrane Status (PeriCalm): SRM (Spontaneous Rupture)      Rupture Date/Time (PeriCalm):        Fluid Amount (PeriCalm): Moderate      Fluid Color (PeriCalm): Meconium Thick       steroids: None     Antibiotics given for GBS: Yes     Induction: none     Indications for induction:        Augmentation:       Indications for augmentation:       Labor complications: None     Additional complications:          Cervical ripening:                     Delivery:      Episiotomy: None     Indication for Episiotomy:       Perineal Lacerations: 1st Repaired:  Yes   Periurethral Laceration: right Repaired: Yes   Labial Laceration: left Repaired: Yes   Sulcus Laceration:   Repaired:     Vaginal Laceration:   Repaired:     Cervical Laceration:   Repaired:     Repair suture:       Repair # of packets: 2     Last Value - EBL - Nursing (mL):       Sum - EBL - Nursing (mL): 0     Last Value - EBL - Anesthesia (mL):      Calculated QBL (mL): 294      Vaginal Sweep Performed: Yes     Surgicount Correct: Yes       Other providers:       Anesthesia    Method:  Epidural          Details (if  applicable):  Trial of Labor      Categorization:      Priority:     Indications for :     Incision Type:       Additional  information:  Forceps:    Vacuum:    Breech:    Observed anomalies    Other (Comments):

## 2019-05-23 NOTE — H&P
HISTORY AND PHYSICAL                                                OBSTETRICS          Subjective:      Ashley Lopez is a 29 y.o. [id number] at 38w6d who arrives via EMS complaining of ctx q5 mins. Pt is IV drug abuser, last used heroin yesterday.  This IUP is complicated by chronic hepatitis C infection. Patient  denies vaginal bleeding, is unsure of LOF. Fetal Movement: normal.     Pt checked in the [facility] and found to be 9/90/0. Emergently moved to L&D    Review of Systems   Constitutional: Negative for chills and fever.   HENT: Negative for postnasal drip, rhinorrhea, sinus pressure and sore throat.    Eyes: Negative for photophobia and visual disturbance.   Respiratory: Negative for cough and shortness of breath.    Cardiovascular: Negative for chest pain and palpitations.   Gastrointestinal: Positive for abdominal pain. Negative for nausea and vomiting.   Genitourinary: Negative for dysuria, frequency and urgency.   Musculoskeletal: Negative for back pain.   Skin: Negative for pallor and rash.   Neurological: Negative for dizziness, light-headedness and headaches.   Psychiatric/Behavioral: The patient is not nervous/anxious.      PMHx:   Past Medical History:   Diagnosis Date    Gall stones        PSHx:   Past Surgical History:   Procedure Laterality Date    CHOLECYSTECTOMY      CHOLECYSTECTOMY-LAPAROSCOPIC N/A 2016    Performed by Jose Orantes MD at [facility] OR       All: Review of patient's allergies indicates:  No Known Allergies    Meds:   Medications Prior to Admission   Medication Sig Dispense Refill Last Dose    amoxicillin (AMOXIL) 500 MG capsule TAKE ONE CAPSULE BY MOUTH THREE TIMES DAILY UNTIL GONE  0 Taking    buprenorphine-naloxone 2-0.5 mg (SUBOXONE) 2-0.5 mg Subl Place under the tongue every 6 (six) hours as needed.   Taking    prenatal vit-iron fum-folic ac 28 mg iron- 800 mcg Tab Take 1 tablet by mouth once daily. 30 tablet 9     promethazine (PHENERGAN) 25 MG tablet Take 1  tablet (25 mg total) by mouth every 6 (six) hours as needed for Nausea. 30 tablet 1        SH:   Social History     Socioeconomic History    Marital status: Single     Spouse name: Not on file    Number of children: Not on file    Years of education: Not on file    Highest education level: Not on file   Occupational History    Not on file   Social Needs    Financial resource strain: Not on file    Food insecurity:     Worry: Not on file     Inability: Not on file    Transportation needs:     Medical: Not on file     Non-medical: Not on file   Tobacco Use    Smoking status: Current Every Day Smoker     Packs/day: 0.50     Years: 8.00     Pack years: 4.00     Types: Cigarettes    Smokeless tobacco: Never Used   Substance and Sexual Activity    Alcohol use: No     Frequency: Never     Comment: occasionally; pre pregnancy    Drug use: Yes     Types: Marijuana     Comment: History of heroin and opiate abuse.    Sexual activity: Not on file   Lifestyle    Physical activity:     Days per week: Not on file     Minutes per session: Not on file    Stress: Not on file   Relationships    Social connections:     Talks on phone: Not on file     Gets together: Not on file     Attends Confucianism service: Not on file     Active member of club or organization: Not on file     Attends meetings of clubs or organizations: Not on file     Relationship status: Not on file   Other Topics Concern    Not on file   Social History Narrative    Not on file       FH:   Family History   Problem Relation Age of Onset    No Known Problems Mother     No Known Problems Father     Emphysema Maternal Grandmother     Cancer Maternal Grandfather     Diabetes Paternal Grandmother     Heart disease Paternal Grandmother     Breast cancer Neg Hx     Colon cancer Neg Hx     Ovarian cancer Neg Hx        OBHx:   OB History    Para Term  AB Living   3 0 0 0 2 0   SAB TAB Ectopic Multiple Live Births   2 0 0 0 0      #  "Outcome Date GA Lbr Louis/2nd Weight Sex Delivery Anes PTL Lv   3 Current            2 SAB            1 SAB                Objective:       /78   Pulse 65   Temp 96.6 °F (35.9 °C) (Temporal)   Resp 18   Ht 5' 4" (1.626 m)   Wt 60.8 kg (134 lb)   LMP 08/08/2018 (Exact Date)   SpO2 100%   Breastfeeding? No   BMI 23.00 kg/m²     Vitals:    05/23/19 0004 05/23/19 0016 05/23/19 0019 05/23/19 0021   BP: 133/77  117/78    Pulse: 61 63 71 65   Resp:   18    Temp:       TempSrc:       SpO2:  98%  100%   Weight:       Height:           Physical Exam     Constitutional: She appears well-developed and well-nourished. She appears distressed.   HENT:   Head: Normocephalic and atraumatic.   Cardiovascular: Normal rate, regular rhythm, normal heart sounds and intact distal pulses.   Pulmonary/Chest: Breath sounds normal.   Abdominal: Soft.   Neurological: She is alert and oriented to person, place, and time.   Skin: Skin is warm and dry.   Psychiatric: She has a normal mood and affect. Her behavior is normal. Judgment and thought content normal.      OB LABOR EXAM:         Method: Sterile vaginal exam per MD.         Dilatation: 9.   Station: 0.   Effacement: 90%.         Comments: BBOW     Obtain Fetal nonstress test (NST)  Date/Time: 5/22/2019 9:57 PM  Performed by: Sara Burgess MD  Authorized by: Sara Burgess MD      Nonstress Test:     Variability:  6-25 BPM    Decelerations:  None    Accelerations:  15 bpm    Baseline:  120    Contractions:  Regular    Contraction Frequency:  3-5  Biophysical Profile:     Nonstress Test Interpretation: reactive      Overall Impression:  Reassuring        Labs Reviewed - No data to display     Lab Review  Blood Type AB POS  GBBS: unknown  Rubella: Immune  RPR: non-reactive  HIV: negative  HepB: negative       Assessment:       39w0d weeks gestation with normal labor    Active Hospital Problems    Diagnosis  POA    Normal labor [O80, Z37.9]  Not Applicable    "   Resolved Hospital Problems   No resolved problems to display.          Plan:     1. Normal labor   Risks, benefits, alternatives and possible complications have been discussed in detail with the patient.   - Consents signed and to chart  - Admit to Labor and Delivery unit   - Epidural per Anesthesia  - Draw CBC, T&S  - Notify Staff, Dr. Barber aware    2. Hepatitis C infection  - Hep C ab positive on 4/22/19  - will notify peds  - pt will need hepatology follow up post partum    3. IV drug abuse  - last used heroin yesterday  - pt admits to intermittently taking suboxone  - will prescribe suboxone inpatient  - monitor for s/s of withdrawal  - will notify peds  - SW consult postpartum    4. GBS unknown  - pt with scant PNC, GBS not collected  - will treat with ampicillin due to ability to administer medication quicker, pt with imminent delivery    Post-Partum Hemorrhage risk - low    Sara Burgess MD   OBGYN, PGY-1

## 2019-05-24 VITALS
DIASTOLIC BLOOD PRESSURE: 59 MMHG | BODY MASS INDEX: 22.88 KG/M2 | WEIGHT: 134 LBS | HEIGHT: 64 IN | TEMPERATURE: 98 F | OXYGEN SATURATION: 97 % | SYSTOLIC BLOOD PRESSURE: 106 MMHG | RESPIRATION RATE: 18 BRPM | HEART RATE: 68 BPM

## 2019-05-24 PROCEDURE — 25000003 PHARM REV CODE 250: Performed by: OBSTETRICS & GYNECOLOGY

## 2019-05-24 PROCEDURE — 25000003 PHARM REV CODE 250: Performed by: STUDENT IN AN ORGANIZED HEALTH CARE EDUCATION/TRAINING PROGRAM

## 2019-05-24 PROCEDURE — 63600175 PHARM REV CODE 636 W HCPCS: Performed by: STUDENT IN AN ORGANIZED HEALTH CARE EDUCATION/TRAINING PROGRAM

## 2019-05-24 PROCEDURE — 99238 PR HOSPITAL DISCHARGE DAY,<30 MIN: ICD-10-PCS | Mod: ,,, | Performed by: OBSTETRICS & GYNECOLOGY

## 2019-05-24 PROCEDURE — 99238 HOSP IP/OBS DSCHRG MGMT 30/<: CPT | Mod: ,,, | Performed by: OBSTETRICS & GYNECOLOGY

## 2019-05-24 RX ORDER — GUAIFENESIN 100 MG/5ML
200 SOLUTION ORAL EVERY 4 HOURS PRN
Status: DISCONTINUED | OUTPATIENT
Start: 2019-05-24 | End: 2019-05-24 | Stop reason: HOSPADM

## 2019-05-24 RX ORDER — IBUPROFEN 600 MG/1
600 TABLET ORAL EVERY 6 HOURS
Qty: 30 TABLET | Refills: 1 | Status: SHIPPED | OUTPATIENT
Start: 2019-05-24

## 2019-05-24 RX ORDER — FERROUS SULFATE 325(65) MG
325 TABLET ORAL
Qty: 30 TABLET | Refills: 0 | COMMUNITY
Start: 2019-05-24

## 2019-05-24 RX ORDER — MEDROXYPROGESTERONE ACETATE 150 MG/ML
150 INJECTION, SUSPENSION INTRAMUSCULAR ONCE
Status: COMPLETED | OUTPATIENT
Start: 2019-05-24 | End: 2019-05-24

## 2019-05-24 RX ADMIN — HYDROCODONE BITARTRATE AND ACETAMINOPHEN 1 TABLET: 10; 325 TABLET ORAL at 01:05

## 2019-05-24 RX ADMIN — MEDROXYPROGESTERONE ACETATE 150 MG: 150 INJECTION, SUSPENSION, EXTENDED RELEASE INTRAMUSCULAR at 11:05

## 2019-05-24 RX ADMIN — IBUPROFEN 600 MG: 600 TABLET ORAL at 11:05

## 2019-05-24 RX ADMIN — IBUPROFEN 600 MG: 600 TABLET ORAL at 03:05

## 2019-05-24 RX ADMIN — BUPRENORPHINE 2 MG: 2 TABLET SUBLINGUAL at 08:05

## 2019-05-24 RX ADMIN — GUAIFENESIN 200 MG: 100 SOLUTION ORAL at 06:05

## 2019-05-24 RX ADMIN — GUAIFENESIN 200 MG: 100 SOLUTION ORAL at 01:05

## 2019-05-24 RX ADMIN — ONDANSETRON 8 MG: 8 TABLET, ORALLY DISINTEGRATING ORAL at 06:05

## 2019-05-24 RX ADMIN — HYDROCODONE BITARTRATE AND ACETAMINOPHEN 1 TABLET: 10; 325 TABLET ORAL at 11:05

## 2019-05-24 RX ADMIN — HYDROCODONE BITARTRATE AND ACETAMINOPHEN 1 TABLET: 10; 325 TABLET ORAL at 06:05

## 2019-05-24 NOTE — PROGRESS NOTES
POSTPARTUM PROGRESS NOTE     Ashley Lopez is a 29 y.o. female PPD #2 status post Spontaneous vaginal delivery at 39w0d in a pregnancy complicated by IVDU (street suboxone) and +Utox. Patient is doing well this morning. She denies nausea, vomiting, fever or chills.    Patient reports mild abdominal pain that is well relieved by oral pain medications. Lochia is mild and decreasing. Patient is voiding without difficulty and ambulating with no difficulty. She has passed flatus, and has not had BM.    Patient does not plan to breast feed. Considering depo provera for contraception. Initially desired IUD however, was unable to receive in immediate post partum period, amenable to Nexplanon if able to obtain for patient. She desires circumcision however baby in NICU.     Objective:       Temp:  [98.1 °F (36.7 °C)-99.7 °F (37.6 °C)] 98.1 °F (36.7 °C)  Pulse:  [63-73] 73  Resp:  [17-18] 18  SpO2:  [97 %-98 %] 97 %  BP: (105-120)/(56-64) 120/63    General:   alert, appears stated age and cooperative   Lungs:   clear to auscultation bilaterally   Heart:   regular rate and rhythm, S1, S2 normal, no murmur, click, rub or gallop   Abdomen:  soft, non-tender; bowel sounds normal; no masses,  no organomegaly   Uterus:  firm located at the umblicus.    Extremities: peripheral pulses normal, no pedal edema, no clubbing or cyanosis     Lab Review  No results found for this or any previous visit (from the past 4 hour(s)).    I/O  No intake or output data in the 24 hours ending 05/24/19 0622     Assessment:     Patient Active Problem List   Diagnosis    Suboxone maintenance treatment complicating pregnancy, antepartum    High risk pregnancy due to maternal drug abuse, antepartum    High risk pregnancy, antepartum    Encounter for initial prescription of intrauterine contraceptive device (IUD)    Chronic hepatitis C affecting pregnancy, antepartum    Normal labor        Plan:   1. Postpartum care:  - Patient doing well. Continue  routine management and advances.  - Continue PO pain meds. Pain well controlled.  - Heme: H/h: 11.4/36.5>11.7/36.1>9.2/28.5  - Encourage ambulation  - Circumcision - baby in NICU  - Contraception - will attempt to   - Lactation consult PRN    2. IVDU  - Was previously on street suboxone   - Utox: + cocaine, opiates, THC  - HIV negative  - Social work contacted, did meet with patient yesterday     3. Acute blood loss anemia  - Asymptomatic  - Bleeding has been stable per patient  - Heme: H/h: 11.4/36.5>11.7/36.1>9.2/28.5  - Iron and colace    Dispo: As patient meets milestones, will plan to discharge today.     Zehra Szymanski MD  OB/GYN  PGY-1

## 2019-05-24 NOTE — DISCHARGE SUMMARY
Delivery Discharge Summary  Obstetrics      Primary OB Clinician: Jacquie Barber MD     Admission date: 2019  Discharge date: 2019    Disposition: To home, self care    Discharge Diagnosis List:      Patient Active Problem List   Diagnosis    Suboxone maintenance treatment complicating pregnancy, antepartum    High risk pregnancy due to maternal drug abuse, antepartum    High risk pregnancy, antepartum    Encounter for initial prescription of intrauterine contraceptive device (IUD)    Chronic hepatitis C affecting pregnancy, antepartum    Normal labor     (spontaneous vaginal delivery)       Procedure:     Hospital Course:  Ashley Lopez is a 29 y.o. now , PPD #2 who was admitted on 2019 at 38w6d for normal labor. Patient was subsequently admitted to labor and delivery unit with signed consents.     Labor course was uncomplicated and resulted in  without complications.      Please see delivery note for further details. Her postpartum course was uncomplicated. On discharge day, patient's pain is controlled with oral pain medications. Pt is tolerating ambulation without SOB or CP, and regular diet without N/V. Reports lochia is mild. Denies any HA, vision changes, F/C, LE swelling. Denies any breast pain/soreness.    Pt in stable condition and ready for discharge. She has been instructed to start and/or continue medications and follow up with her obstetrics provider as listed below.    Pertinent studies:  CBC  Recent Labs   Lab 19  0002 19  0828   WBC 12.29 11.44   HGB 11.7* 9.2*   HCT 36.1* 28.5*   MCV 88 87    283      There is no immunization history for the selected administration types on file for this patient.     Delivery:    Episiotomy: None   Lacerations: 1st   Repair suture:     Repair # of packets: 2   Blood loss (ml):       Birth information:  YOB: 2019   Time of birth: 10:22 PM   Sex: male   Delivery type: Vaginal,  Spontaneous   Gestational Age: 38w6d    Delivery Clinician:      Other providers:       Additional  information:  Forceps:    Vacuum:    Breech:    Observed anomalies      Living?:           APGARS  One minute Five minutes Ten minutes   Skin color:         Heart rate:         Grimace:         Muscle tone:         Breathing:         Totals: 7  9        Placenta: Delivered:       appearance      Patient Instructions:   Current Discharge Medication List      START taking these medications    Details   ferrous sulfate (FEOSOL) 325 mg (65 mg iron) Tab tablet Take 1 tablet (325 mg total) by mouth daily with breakfast.  Refills: 0         CONTINUE these medications which have NOT CHANGED    Details   amoxicillin (AMOXIL) 500 MG capsule TAKE ONE CAPSULE BY MOUTH THREE TIMES DAILY UNTIL GONE  Refills: 0      buprenorphine-naloxone 2-0.5 mg (SUBOXONE) 2-0.5 mg Subl Place under the tongue every 6 (six) hours as needed.      prenatal vit-iron fum-folic ac 28 mg iron- 800 mcg Tab Take 1 tablet by mouth once daily.  Qty: 30 tablet, Refills: 9    Comments: Please dispense prenatal vitamin formulation covered by patient's insurance.  Thank you.  Associated Diagnoses: High risk pregnancy due to maternal drug abuse in third trimester      promethazine (PHENERGAN) 25 MG tablet Take 1 tablet (25 mg total) by mouth every 6 (six) hours as needed for Nausea.  Qty: 30 tablet, Refills: 1    Associated Diagnoses: Nausea/vomiting in pregnancy             Discharge Procedure Orders   Diet Adult Regular     Notify your health care provider if you experience any of the following:  temperature >100.4     Notify your health care provider if you experience any of the following:  persistent nausea and vomiting or diarrhea     Notify your health care provider if you experience any of the following:  severe uncontrolled pain     Notify your health care provider if you experience any of the following:  difficulty breathing or increased cough     Notify  your health care provider if you experience any of the following:  severe persistent headache     Notify your health care provider if you experience any of the following:  worsening rash     Notify your health care provider if you experience any of the following:  persistent dizziness, light-headedness, or visual disturbances     Notify your health care provider if you experience any of the following:  increased confusion or weakness     Notify your health care provider if you experience any of the following:   Order Comments: Notify MD if bleeding 1 pad/hour for 2 consecutive hours.     No dressing needed     Activity as tolerated   Order Comments: Pelvic rest until cleared by MD. Nothing in vagina till cleared by MD including tampons, douching, intercourse            Zehra Szymanski MD  OB/GYN  PGY-1

## 2019-05-24 NOTE — PLAN OF CARE
DCFS worker is here to see pt and baby in the NICU.     DCFS worker-Radha Alvarez  Phone:[phone]    Will f/u.     Courtney Lafont, LCSW [facility] [place]  [email or web address]    (phone) [phone] or  Ext. [phone]  (fax) [phone]

## 2019-05-24 NOTE — PLAN OF CARE
"Pt was interested in [facility] detox but they will not take a postpartum pt until at least 30 days after delivery. This is a new requirement.     Contacted [facility]. Pt would have to be detoxed from all substances including subutex. Pt feels as though it will be hard to say off of heroin unless she is on an opioid replacement therapy (subutex). SW verbalized understanding. Another option with [facility] is that pt can find an outside subutex provider and go straight into [facility] 30 day residential program.     Pt is undecided what she would like to do at this time. Pt is awaiting [facility] recs to make a decision for d/c.    Contacted [facility] recovery (subutex provider). Pt would not be able to be see there until Tuesday, May 28th due to Memorial day holiday. Visit would be $200 cash pay. Pt aware of cost and reported this would be hard to obtain all at once.     Contacted Addiction recovery resources of [facility]. They are an outpt program that would provide outpt suboxone but only if pt enrolls in [facility]. Location is in [place] and pt reported that is too far from current residence in [place].     Contact outpt suboxone provider, Dr. White ([phone]). He is out of town all next week and cost is $280 per month.     [facility] will be contacting pt soon regarding 1 or 2 other options.     At time time, pt plans to d/c home to her mom's and try to "wing it" until she can get in with an affordable subutex/suboxone provider. Strongly discouraged pt from this plan bc it increases her chances of relapse but pt has the right to self determination and this is the decision she is currently making. The above mentioned resources will be listed in pt AVS. Pt is aware of this in the event she changes her mind about any of these resources.    SW team will cont to follow pt for emotional support and d/c planning.     Vika Nguyễn LCSW    [facility]  [email or web address]    (phone) [phone] or  " Ext. [phone]  (fax) [phone]

## 2019-08-26 PROBLEM — Z37.9 NORMAL LABOR: Status: RESOLVED | Noted: 2019-05-22 | Resolved: 2019-08-26

## 2022-08-22 ENCOUNTER — TELEPHONE (OUTPATIENT)
Dept: PRIMARY CARE CLINIC | Facility: CLINIC | Age: 32
End: 2022-08-22
Payer: MEDICAID

## 2022-08-22 NOTE — TELEPHONE ENCOUNTER
Returned call to patient. Let her know that we aren't accepting any new medicaid patients. Gave her the number to access health to schedule an appt.

## 2022-08-22 NOTE — TELEPHONE ENCOUNTER
----- Message from Juan David Winters sent at 8/22/2022  8:55 AM CDT -----  Pt called in about wanting to schedule appt. Pt just out of rehab. Pt stated that she need medication refill            Pt can be reached at [phone]              TY